# Patient Record
Sex: MALE | Race: WHITE
[De-identification: names, ages, dates, MRNs, and addresses within clinical notes are randomized per-mention and may not be internally consistent; named-entity substitution may affect disease eponyms.]

---

## 2018-04-02 ENCOUNTER — HOSPITAL ENCOUNTER (OUTPATIENT)
Dept: HOSPITAL 17 - CPRE | Age: 83
End: 2018-04-02
Attending: SURGERY
Payer: MEDICARE

## 2018-04-02 DIAGNOSIS — Z01.812: Primary | ICD-10-CM

## 2018-04-02 DIAGNOSIS — M79.609: ICD-10-CM

## 2018-04-02 LAB
ALBUMIN SERPL-MCNC: 3.7 GM/DL (ref 3.4–5)
ALP SERPL-CCNC: 113 U/L (ref 45–117)
ALT SERPL-CCNC: 21 U/L (ref 12–78)
AST SERPL-CCNC: 25 U/L (ref 15–37)
BASOPHILS # BLD AUTO: 0.1 TH/MM3 (ref 0–0.2)
BASOPHILS NFR BLD: 0.7 % (ref 0–2)
BILIRUB SERPL-MCNC: 0.9 MG/DL (ref 0.2–1)
BUN SERPL-MCNC: 21 MG/DL (ref 7–18)
CALCIUM SERPL-MCNC: 8.4 MG/DL (ref 8.5–10.1)
CHLORIDE SERPL-SCNC: 109 MEQ/L (ref 98–107)
COLOR UR: YELLOW
CREAT SERPL-MCNC: 1.45 MG/DL (ref 0.6–1.3)
EOSINOPHIL # BLD: 0.1 TH/MM3 (ref 0–0.4)
EOSINOPHIL NFR BLD: 1 % (ref 0–4)
ERYTHROCYTE [DISTWIDTH] IN BLOOD BY AUTOMATED COUNT: 14.3 % (ref 11.6–17.2)
GFR SERPLBLD BASED ON 1.73 SQ M-ARVRAT: 46 ML/MIN (ref 89–?)
GLUCOSE UR STRIP-MCNC: (no result) MG/DL
HCO3 BLD-SCNC: 24.1 MEQ/L (ref 21–32)
HCT VFR BLD CALC: 33.7 % (ref 39–51)
HGB BLD-MCNC: 12.3 GM/DL (ref 13–17)
HGB UR QL STRIP: (no result)
INR PPP: 1.1 RATIO
KETONES UR STRIP-MCNC: (no result) MG/DL
LYMPHOCYTES # BLD AUTO: 1.8 TH/MM3 (ref 1–4.8)
LYMPHOCYTES NFR BLD AUTO: 26.7 % (ref 9–44)
MCH RBC QN AUTO: 39.8 PG (ref 27–34)
MCHC RBC AUTO-ENTMCNC: 36.4 % (ref 32–36)
MCV RBC AUTO: 109.3 FL (ref 80–100)
MONOCYTE #: 0.6 TH/MM3 (ref 0–0.9)
MONOCYTES NFR BLD: 8.3 % (ref 0–8)
MUCOUS THREADS #/AREA URNS LPF: (no result) /LPF
NEUTROPHILS # BLD AUTO: 4.4 TH/MM3 (ref 1.8–7.7)
NEUTROPHILS NFR BLD AUTO: 63.3 % (ref 16–70)
NITRITE UR QL STRIP: (no result)
PLATELET # BLD: 335 TH/MM3 (ref 150–450)
PMV BLD AUTO: 9.5 FL (ref 7–11)
PROT SERPL-MCNC: 7.8 GM/DL (ref 6.4–8.2)
PROTHROMBIN TIME: 11.3 SEC (ref 9.8–11.6)
RBC # BLD AUTO: 3.08 MIL/MM3 (ref 4.5–5.9)
SODIUM SERPL-SCNC: 140 MEQ/L (ref 136–145)
SP GR UR STRIP: 1.02 (ref 1–1.03)
SPHEROCYTES BLD QL SMEAR: (no result)
URINE LEUKOCYTE ESTERASE: (no result)
WBC # BLD AUTO: 6.9 TH/MM3 (ref 4–11)

## 2018-04-02 PROCEDURE — 80053 COMPREHEN METABOLIC PANEL: CPT

## 2018-04-02 PROCEDURE — 81001 URINALYSIS AUTO W/SCOPE: CPT

## 2018-04-02 PROCEDURE — 85610 PROTHROMBIN TIME: CPT

## 2018-04-02 PROCEDURE — 36415 COLL VENOUS BLD VENIPUNCTURE: CPT

## 2018-04-02 PROCEDURE — 85025 COMPLETE CBC W/AUTO DIFF WBC: CPT

## 2018-04-02 PROCEDURE — 85730 THROMBOPLASTIN TIME PARTIAL: CPT

## 2018-04-03 NOTE — MH
cc:

TYLER Rabago MD

****

 

 

DATE OF ADMISSION:

04/09/2018

 

ADMITTING DIAGNOSIS:

Osteoarthritic degeneration of the left knee, now being admitted for 

left total knee arthroplasty.

 

HISTORY OF PRESENT ILLNESS:

This pleasant 87-year-old male is being admitted today for a left 

total knee arthroplasty due to severe painful osteoarthritic 

degeneration of the left knee.  He  has been cleared by his primary 

care physician.

 

PAST MEDICAL HISTORY:

He has a history of dizziness.  He does have a type of anemia for 

which he is on B12.

 

MEDICATIONS:

He also takes Folic acid, omeprazole and tamsulosin.

 

REVIEW OF SYSTEMS:

Noncontributory.

 

FAMILY HISTORY:

Noncontributory.

 

SOCIAL HISTORY:

The patient does not smoke cigarettes and he does not drink.

 

ALLERGIES:

NO KNOWN ALLERGIES.

 

PHYSICAL EXAMINATION:

GENERAL  We find an 87-year-old male, well-developed, well-nourished, 

alert and oriented x 3, complaining of pain in his left knee.

VITAL SIGNS:  Blood pressure 118/78, pulse 75 and regular, 

respirations 16, temperature 98.4, pulse oximetry 98% on room air.

HEENT:  Eyes:  PERRL.  EOMI.  Ears, nose, and mouth clear.

NECK:  Supple.

LUNGS:  Clear.

HEART:  Regular rate.

ABDOMEN:  Soft, positive bowel sounds, nontender.

EXTREMITIES:  Reveals the left knee to be tender with crepitance on 

range of motion.  He lacks 5 degrees short of full extension.  He is 

neurovascularly intact to his toes.

 

IMPRESSION:

Severe painful osteoarthritic degeneration of the left knee.

 

PLAN:

Admission for left total knee arthroplasty today.  The patient is 

given his current postoperative pain, anticoagulation control in the 

office, plans on going home with home health care and physical therapy

after the surgical stay in the hospital.

 

 

__________________________________

TYLER Rabago MD

 

 

JRNICK/KD

D: 04/03/2018, 04:59 PM

T: 04/03/2018, 05:22 PM

Visit #: [id number]

Job #: [id number]

## 2018-04-09 ENCOUNTER — HOSPITAL ENCOUNTER (INPATIENT)
Dept: HOSPITAL 17 - HSDI | Age: 83
LOS: 3 days | Discharge: NURSING, REHAB OR LONG-TERM CARE | DRG: 470 | End: 2018-04-12
Attending: SURGERY | Admitting: SURGERY
Payer: MEDICARE

## 2018-04-09 VITALS
TEMPERATURE: 98.5 F | RESPIRATION RATE: 18 BRPM | DIASTOLIC BLOOD PRESSURE: 75 MMHG | OXYGEN SATURATION: 97 % | HEART RATE: 73 BPM | SYSTOLIC BLOOD PRESSURE: 123 MMHG

## 2018-04-09 VITALS — WEIGHT: 176.37 LBS | HEIGHT: 71 IN | BODY MASS INDEX: 24.69 KG/M2

## 2018-04-09 VITALS — HEART RATE: 68 BPM

## 2018-04-09 VITALS
TEMPERATURE: 97.7 F | HEART RATE: 67 BPM | DIASTOLIC BLOOD PRESSURE: 73 MMHG | RESPIRATION RATE: 17 BRPM | SYSTOLIC BLOOD PRESSURE: 154 MMHG | OXYGEN SATURATION: 95 %

## 2018-04-09 DIAGNOSIS — D47.2: ICD-10-CM

## 2018-04-09 DIAGNOSIS — M17.12: Primary | ICD-10-CM

## 2018-04-09 DIAGNOSIS — K21.9: ICD-10-CM

## 2018-04-09 DIAGNOSIS — N40.0: ICD-10-CM

## 2018-04-09 PROCEDURE — 84132 ASSAY OF SERUM POTASSIUM: CPT

## 2018-04-09 PROCEDURE — 80048 BASIC METABOLIC PNL TOTAL CA: CPT

## 2018-04-09 PROCEDURE — 3E0T3BZ INTRODUCTION OF ANESTHETIC AGENT INTO PERIPHERAL NERVES AND PLEXI, PERCUTANEOUS APPROACH: ICD-10-PCS

## 2018-04-09 PROCEDURE — 85018 HEMOGLOBIN: CPT

## 2018-04-09 PROCEDURE — C1776 JOINT DEVICE (IMPLANTABLE): HCPCS

## 2018-04-09 PROCEDURE — 94150 VITAL CAPACITY TEST: CPT

## 2018-04-09 PROCEDURE — 86900 BLOOD TYPING SEROLOGIC ABO: CPT

## 2018-04-09 PROCEDURE — 86850 RBC ANTIBODY SCREEN: CPT

## 2018-04-09 PROCEDURE — 73560 X-RAY EXAM OF KNEE 1 OR 2: CPT

## 2018-04-09 PROCEDURE — 85025 COMPLETE CBC W/AUTO DIFF WBC: CPT

## 2018-04-09 PROCEDURE — C9290 INJ, BUPIVACAINE LIPOSOME: HCPCS

## 2018-04-09 PROCEDURE — 86901 BLOOD TYPING SEROLOGIC RH(D): CPT

## 2018-04-09 PROCEDURE — 83735 ASSAY OF MAGNESIUM: CPT

## 2018-04-09 PROCEDURE — 85014 HEMATOCRIT: CPT

## 2018-04-09 PROCEDURE — 86922 COMPATIBILITY TEST ANTIGLOB: CPT

## 2018-04-09 PROCEDURE — 0SRD0J9 REPLACEMENT OF LEFT KNEE JOINT WITH SYNTHETIC SUBSTITUTE, CEMENTED, OPEN APPROACH: ICD-10-PCS | Performed by: SURGERY

## 2018-04-09 PROCEDURE — 86920 COMPATIBILITY TEST SPIN: CPT

## 2018-04-09 RX ADMIN — TAMSULOSIN HYDROCHLORIDE SCH MG: 0.4 CAPSULE ORAL at 20:13

## 2018-04-09 RX ADMIN — TEMAZEPAM PRN MG: 15 CAPSULE ORAL at 21:59

## 2018-04-09 RX ADMIN — OXYTOCIN SCH MLS/HR: 10 INJECTION, SOLUTION INTRAMUSCULAR; INTRAVENOUS at 17:55

## 2018-04-09 RX ADMIN — HYDROCODONE BITARTRATE AND ACETAMINOPHEN PRN TAB: 7.5; 325 TABLET ORAL at 20:13

## 2018-04-09 NOTE — HHI.PR
__________________________________________________





Immediate Post Op Note


Procedure Date:


Apr 9, 2018


Pre Op Diagnosis:  


Osteoarthritic degeneration of the left knee


Post Op Diagnosis:  


Osteoarthritic degeneration of the left knee


Surgeon:


ALFREDO Rabago MD


Assistant(s):


Lisa NUGENT


Procedure:


Left Total Knee Arthroplasty


Complications:


none


Specimen(s) removed:


none


Estimated blood loss:


200 cc


Anesthesia:  Spinal


Drains:  None


IVF


Urinary Output (mLs):  0 (No palma)


Tourniquet time (min at mmHg)


none


Patient to:  PACU


Patient Condition:  Good


Implant/Devices:  SEE IMPLANT LOG (if applicable)


Date/Time of Procedure:  SEE SURGICAL CARE RECORD











Lisa Aldridge Apr 9, 2018 17:32

## 2018-04-09 NOTE — RADRPT
EXAM DATE/TIME:  04/09/2018 18:46 

 

[facility] COMPARISON:     

No previous studies available for comparison.

 

                     

INDICATIONS :     

Post operative left knee.

                     

 

MEDICAL HISTORY :     

None.          

 

SURGICAL HISTORY :     

None.   

 

ENCOUNTER:     

Initial                                        

 

ACUITY:     

1 day      

 

PAIN SCORE:     

0/10

 

LOCATION:     

Left  knee.

 

FINDINGS:     

Two view examination of the left knee demonstrates postoperative left total knee replacement. Air and
 fluid in the soft tissues. No complications identified.

 

CONCLUSION:     

1. Postoperative left total knee replacement as above.

 

 

 

 Wm Dobson MD on April 09, 2018 at 18:02           

Board Certified Radiologist.

 This report was verified electronically.

## 2018-04-09 NOTE — MP
cc:

TYLER Rabago MD

****

 

 

DATE OF OPERATION:

04/09/2018

 

PREOPERATIVE DIAGNOSIS:

Osteoarthritic degeneration, left knee.

 

POSTOPERATIVE DIAGNOSIS:

Osteoarthritic degeneration, left knee.

 

PROCEDURE PERFORMED:

Left total knee arthroplasty using Consensus components, size 6 femur,

4 tibia, 10 standard insert and size 2 x 10 mm patella with 3 batches 

of DePuy cement.

 

SURGEON:

TYLER Rabago MD

 

ASSISTANT:

JOVANNA Raines

 

TYPE OF ANESTHESIA:

Spinal.

 

PROCEDURE:  After successful induction of anesthesia, the patient is 

placed on the operating room table in the supine position.  The knee 

is prepped and draped in the usual manner.  No tourniquet was utilized

in this case.  A longitudinal incision is made extending from 3 inches

proximal to the superior pole of the patella, across the patella in 

longitudinal fashion, and down past the insertion of the tibial 

tubercle into the proximal tibia.  The incision is carried down 

through subcutaneous tissue along the medial aspect of the patella and

retinaculum, down through the capsule to expose the knee joint.  The 

patella and patellar tendon are freed up enough to allow the patella 

to be inverted and retracted off the lateral side of the knee joint.  

The knee joint is left exposed.  Small osteophytes are removed.  All 

soft tissue is removed to allow proper position of the femoral and 

tibial cutting jig guide.  The first femoral jig is then inserted 

along the distal end of the femur after first measuring to decide 

whether this is a small, medium, or large component.  The notch is 

then drilled and the tibial cutting guide inserted into the femoral 

cutting guide, along with the ankle brace to allow for proper 

measurement of the tibial cutting surface that needed to be resected. 

Pins are inserted into the tibial cutting jig and femoral cutting jig 

to hold them in place.  An oscillating saw is then used to resect the 

surface of the tibia.  The surface of the tibia is then completely 

removed using sharp and blunt dissection.  The anterior and posterior 

cuts of the femur are then made as well using an oscillating saw 

through the cutting guide.  All guides are then removed and the 

varus/valgus angulation cutting guide applied to the femur for proper 

measurement of the proper amount of valgus.  The anterior cutting 

guide for the femur is then inserted at the anterior femoral cuts 

made.  Next, the first block trial is inserted into the femur to allow

for proper condyle drill holes to be made which are then made followed

by removal of the bone between the condyles using an oscillating saw 

as well as the bone removed at the most posterior surface of the 

condyle.  After this, this guide is removed and the chamfer cuts made 

using the chamfer cutting guide from both anterior and posterior.  

Next, the femoral trial is then inserted, the tibial surface reflected

anterior to expose the tibial surface and a tibial stem guide is 

inserted after first measuring for a standard, standard plus, large, 

or large plus surface to be used.  After the stem is impacted the 

trial tibial surface is applied followed by the trial meniscal 

components.  After full range of motion is found with the appropriate 

length meniscal components varying the patella is prepared by 

resecting the posterior aspect of the patella using an oscillating 

saw, inserting a trial.  The trial is then removed and the cruciate 

cutting guide applied using the bur to cut the cruciate cuts.  After 

cruciate cuts are made all trials are removed.  The wound is irrigated

copiously with antibiotic solution and Water Pik and the actual 

components inserted into place using the aforementioned components.

 

The cement has hardened and the components are found to have full 

range of motion with no instability.  The wound again is irrigated 

copiously with antibiotic solution.  Meticulous hemostasis achieved 

and 120 mL mixture of Exparel, normal saline and 0.25% Marcaine used 

for extra pain control around the knee joint.  Deep fascia 

approximated with running #2 Quill.  Subcutaneous tissue approximated 

using interrupted running 2-0 and 4-0 Monocryl suture and Primapore 

dressing applied and knee immobilizer.  No drain utilized.

 

ESTIMATED BLOOD LOSS:  200 mL.

 

COUNTS:  Sponge and suture counts were correct.

 

COMPONENTS:  The components used were Consensus components, size 6 

femur, 4 tibia, 10 standard insert and size 2 x 10 mm patella with 3 

batches of DePuy cement.

 

The patient tolerated the procedure well and left the operating room 

in satisfactory condition.  Lisa Aldridge was present during the entire

procedure to include patient positioning and the procedure.  The 

medical necessity of the nurse practitioner first assistant was 

indicated in this case due to the surgical complexity of the case 

itself and during the surgical case, the surgical tech was working the

back table while my surgical first assistant ARNP was directly 

assisting me.

 

 

 

__________________________________

J. MD CHELITA Zarco/SHA

D: 04/09/2018, 05:00 PM

T: 04/09/2018, 05:29 PM

Visit #: [id number]

Job #: [id number]

## 2018-04-09 NOTE — HHI.FF
Face to Face Verification


Diagnosis:  


(1) Status post total left knee replacement


Physical Therapy


Gait training


Knee:  Total knee, Protocol: Left, Gait training, Full weight bearing


Canvas Knee Splint:  When in bed & 2 pillows btw thighs





Nursing


RN:  3 days/week x 2 weeks


Nursing:  Dressing changes


Dressing Changes:  Daily dressing change, 4x4s, Gauze, Paper tape











I have seen patient Arthur Ramos on 4/9/18. My clinical findings support 

the need for the requested home health care services because:








 Limited ability to care for self





 High risk of falls














I certify that my clinical findings support that this patient is homebound 

because:








 Unsteady gait/balance

















TYLER Rabago MD Apr 9, 2018 14:16

## 2018-04-10 VITALS
OXYGEN SATURATION: 94 % | RESPIRATION RATE: 18 BRPM | DIASTOLIC BLOOD PRESSURE: 57 MMHG | TEMPERATURE: 97.8 F | HEART RATE: 84 BPM | SYSTOLIC BLOOD PRESSURE: 122 MMHG

## 2018-04-10 VITALS
HEART RATE: 81 BPM | RESPIRATION RATE: 18 BRPM | SYSTOLIC BLOOD PRESSURE: 128 MMHG | DIASTOLIC BLOOD PRESSURE: 66 MMHG | OXYGEN SATURATION: 97 % | TEMPERATURE: 97.9 F

## 2018-04-10 VITALS
DIASTOLIC BLOOD PRESSURE: 67 MMHG | SYSTOLIC BLOOD PRESSURE: 130 MMHG | TEMPERATURE: 97.5 F | HEART RATE: 93 BPM | RESPIRATION RATE: 15 BRPM | OXYGEN SATURATION: 93 %

## 2018-04-10 VITALS
TEMPERATURE: 98.2 F | RESPIRATION RATE: 16 BRPM | DIASTOLIC BLOOD PRESSURE: 69 MMHG | OXYGEN SATURATION: 94 % | SYSTOLIC BLOOD PRESSURE: 136 MMHG | HEART RATE: 98 BPM

## 2018-04-10 VITALS
HEART RATE: 92 BPM | OXYGEN SATURATION: 94 % | SYSTOLIC BLOOD PRESSURE: 125 MMHG | TEMPERATURE: 98.5 F | DIASTOLIC BLOOD PRESSURE: 63 MMHG | RESPIRATION RATE: 17 BRPM

## 2018-04-10 VITALS
HEART RATE: 88 BPM | DIASTOLIC BLOOD PRESSURE: 62 MMHG | RESPIRATION RATE: 17 BRPM | TEMPERATURE: 98.6 F | OXYGEN SATURATION: 94 % | SYSTOLIC BLOOD PRESSURE: 128 MMHG

## 2018-04-10 LAB
HCT VFR BLD CALC: 26.6 % (ref 39–51)
HGB BLD-MCNC: 9.4 GM/DL (ref 13–17)

## 2018-04-10 RX ADMIN — HYDROCODONE BITARTRATE AND ACETAMINOPHEN PRN TAB: 7.5; 325 TABLET ORAL at 22:52

## 2018-04-10 RX ADMIN — HYDROCODONE BITARTRATE AND ACETAMINOPHEN PRN TAB: 7.5; 325 TABLET ORAL at 14:30

## 2018-04-10 RX ADMIN — OXYTOCIN SCH MLS/HR: 10 INJECTION, SOLUTION INTRAMUSCULAR; INTRAVENOUS at 14:04

## 2018-04-10 RX ADMIN — DOCUSATE SODIUM SCH MG: 100 CAPSULE, LIQUID FILLED ORAL at 20:15

## 2018-04-10 RX ADMIN — OXYTOCIN SCH MLS/HR: 10 INJECTION, SOLUTION INTRAMUSCULAR; INTRAVENOUS at 02:39

## 2018-04-10 RX ADMIN — MULTIPLE VITAMINS W/ MINERALS TAB SCH TAB: TAB at 20:15

## 2018-04-10 RX ADMIN — TEMAZEPAM PRN MG: 15 CAPSULE ORAL at 22:51

## 2018-04-10 RX ADMIN — HYDROCODONE BITARTRATE AND ACETAMINOPHEN PRN TAB: 7.5; 325 TABLET ORAL at 10:12

## 2018-04-10 RX ADMIN — TAMSULOSIN HYDROCHLORIDE SCH MG: 0.4 CAPSULE ORAL at 20:15

## 2018-04-10 RX ADMIN — FOLIC ACID SCH MG: 1 TABLET ORAL at 08:29

## 2018-04-10 RX ADMIN — HYDROCODONE BITARTRATE AND ACETAMINOPHEN PRN TAB: 7.5; 325 TABLET ORAL at 00:35

## 2018-04-10 RX ADMIN — CYANOCOBALAMIN TAB 1000 MCG SCH MCG: 1000 TAB at 08:29

## 2018-04-10 RX ADMIN — HYDROCODONE BITARTRATE AND ACETAMINOPHEN PRN TAB: 7.5; 325 TABLET ORAL at 18:41

## 2018-04-10 RX ADMIN — HYDROCODONE BITARTRATE AND ACETAMINOPHEN PRN TAB: 7.5; 325 TABLET ORAL at 05:31

## 2018-04-10 RX ADMIN — PANTOPRAZOLE SODIUM SCH MG: 20 TABLET, DELAYED RELEASE ORAL at 08:29

## 2018-04-10 RX ADMIN — APIXABAN SCH MG: 2.5 TABLET, FILM COATED ORAL at 16:48

## 2018-04-10 NOTE — PD.ORT.PN
Subjective


Subjective Remarks


Pt comfortable at present time.





Objective


Vitals





Vital Signs








  Date Time  Temp Pulse Resp B/P (MAP) Pulse Ox O2 Delivery O2 Flow Rate FiO2


 


4/10/18 08:00     94 Room Air  


 


4/10/18 08:00 98.2 98 16 136/69 (91) 94   


 


4/10/18 06:17   18     


 


4/10/18 04:45 98.5 92 17 125/63 (83) 94   


 


4/10/18 00:05 98.6 88 17 128/62 (84) 94   


 


4/9/18 22:52      Room Air  


 


4/9/18 20:57   18     


 


4/9/18 19:40 97.7 67 17 154/73 (100) 95   


 


4/9/18 18:26 97.9 62 18 136/68 (90) 97 Room Air  


 


4/9/18 18:15  59 15 143/71 (95) 97 Room Air  


 


4/9/18 18:00  60 18 141/66 (91) 95 Room Air  


 


4/9/18 17:45  60 17 163/70 (101) 95 Room Air  


 


4/9/18 17:30  61 20 144/72 (96) 97 Room Air  


 


4/9/18 17:26 98.0 69 19 179/75 (109) 98 Room Air  


 


4/9/18 12:23     99 Nasal Cannula 2 


 


4/9/18 12:23  68      


 


4/9/18 12:17 97.8 71 18 161/78 (105) 100   














I/O      


 


 4/9/18 4/9/18 4/9/18 4/10/18 4/10/18 4/10/18





 07:00 15:00 23:00 07:00 15:00 23:00


 


Intake Total   1620 ml 580 ml  


 


Output Total   200 ml 475 ml  


 


Balance   1420 ml 105 ml  


 


      


 


Intake Oral   20 ml 480 ml  


 


IV Total   100 ml 100 ml  


 


Other   1500 ml   


 


Output Urine Total    475 ml  


 


Estimated Blood Loss   200 ml   


 


# Bowel Movements    0  








Result Diagram:  


4/10/18 0501                                                                   

             4/9/18 1202





Imaging





Last 24 hours Impressions








Knee X-Ray 4/9/18 1409 Signed





Impressions: 





 Service Date/Time:  Monday, April 9, 2018 18:46 - CONCLUSION:  1. 

Postoperative 





 left total knee replacement as above.     Wm Dobson MD 








Objective Remarks


dressing dry and intact. In bed at present. No calf tenderness.





Assessment & Plan


Ortho Post Op Day #:  1


Problem List:  


Assessment and Plan


PT, OOB, home possibly tomorrow.











TYLER Rabago MD Apr 10, 2018 09:55

## 2018-04-10 NOTE — PD.CONS
HPI


Service


[facility] Hospitalists


Consult Requested By


Dr. Isai Rabago


Reason for Consult


Medical Management


Primary Care Physician


Colton Hartman MD


Diagnoses:  


History of Present Illness


Mr. Ramos is a pleasant 86 y/o male with osteoarthritis, BPH, GERD and MGUS (

follows with Dr. Montoya). [place] [facility] [place] for left total 

knee arthroplasty with Dr. Rabago. [facility] Hospitalist team was consulted to help 

with managing his chronic medical issues. Pt is seen post-operatively and is 

doing well. His vital signs are stable. Pt is without any specific complaints 

at the time of examination today. Denies any abd pain, nausea/vomiting, chest 

pain, SOB, palpitations, dizziness or weakness. His pain is fairly well 

controlled at this time.





Review of Systems


Constitutional:  DENIES: Fever, Chills


Eyes:  DENIES: Vision loss


Ears, nose, mouth, throat:  DENIES: Hearing loss


Respiratory:  DENIES: Shortness of breath


Cardiovascular:  DENIES: Chest pain, Palpitations


Gastrointestinal:  DENIES: Abdominal pain, Nausea, Vomiting


Genitourinary:  DENIES: Hematuria, Dysuria


Musculoskeletal:  COMPLAINS OF: Joint pain, DENIES: Back pain


Integumentary:  DENIES: Rash


Neurologic:  DENIES: Headache


Psychiatric:  DENIES: Confusion





Past Family Social History


Past Medical History


Osteoarthritis


BPH


GERD


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


MGUS


Thrombocytosis


Memory loss


Nonthrombocytopenic purpura


Past Surgical History


Appendectomy


Partial colectomy


Vasectomy


Tonsillectomy


Reported Medications


Diazepam 5 Mg PO TID PRN


Tamsulosin 0.4 Mg  HS


Omeprazole 20 Mg PO DAILY


Vitamin B12 1,000 Mcg PO DAILY


Folic Acid 2,000 Mcg PO DAILY


Allergies:  


Coded Allergies:  


     No Known Allergies (Unverified , 4/2/18)


Family History


Noncontributory


Social History


No reported alcohol, tobacco or illicit drug use





Physical Exam


Vital Signs





Vital Signs








  Date Time  Temp Pulse Resp B/P (MAP) Pulse Ox O2 Delivery O2 Flow Rate FiO2


 


4/10/18 08:00     94 Room Air  


 


4/10/18 08:00 98.2 98 16 136/69 (91) 94   


 


4/10/18 06:17   18     


 


4/10/18 04:45 98.5 92 17 125/63 (83) 94   


 


4/10/18 00:05 98.6 88 17 128/62 (84) 94   


 


4/9/18 22:52      Room Air  


 


4/9/18 20:57   18     


 


4/9/18 19:40 97.7 67 17 154/73 (100) 95   


 


4/9/18 18:26 97.9 62 18 136/68 (90) 97 Room Air  


 


4/9/18 18:15  59 15 143/71 (95) 97 Room Air  


 


4/9/18 18:00  60 18 141/66 (91) 95 Room Air  


 


4/9/18 17:45  60 17 163/70 (101) 95 Room Air  


 


4/9/18 17:30  61 20 144/72 (96) 97 Room Air  


 


4/9/18 17:26 98.0 69 19 179/75 (109) 98 Room Air  


 


4/9/18 12:23     99 Nasal Cannula 2 


 


4/9/18 12:23  68      


 


4/9/18 12:17 97.8 71 18 161/78 (105) 100   








Physical Exam


GENERAL: This is a well-nourished, well-developed patient, in no apparent 

distress.


SKIN: No rashes, ecchymoses or lesions. Cool and dry.


HEENT: Atraumatic. Normocephalic. No temporal or scalp tenderness. No scleral 

icterus. Airway patent.


NECK: Trachea midline, supple, nontender.


CARDIO: Regular. 


RESP: CTA bilaterally. No wheezes, rales, or rhonchi.  


ABD: +BS, soft, non-tender, nondistended. 


EXT: Extremities without clubbing, cyanosis, or edema. 


NEURO: Awake and alert.  Motor and sensory grossly within normal limits. Normal 

speech.


Laboratory





Laboratory Tests








Test


  4/9/18


12:02 4/10/18


07:45


 


Potassium Level 4.5  


 


Hemoglobin  9.4 


 


Hematocrit  26.6 








Result Diagram:  


4/10/18 0745                                                                   

             4/9/18 1202





Imaging





Last Impressions








Knee X-Ray 4/9/18 1409 Signed





Impressions: 





 Service Date/Time:  Monday, April 9, 2018 18:46 - CONCLUSION:  1. 

Postoperative 





 left total knee replacement as above.     Wm Dobson MD 











Assessment and Plan


Problem List:  


(1) Osteoarthritis of left knee


ICD Codes:  M17.12 - Unilateral primary osteoarthritis, left knee


Status:  Chronic


Plan:  


s/p Left total knee arthroplasty


Osteoarthritis


- Pt is an 86 y/o male with osteoarthritis, BPH, GERD and MGUS


- He was admitted on 4/9/18 for left total knee arthroplasty


- Post-op pain control per Ortho


- PT daily


- IS


- Constipation precautions


- DVT prophylaxis with Eliquis 2.5mg po BID





BPH


- Home meds continued





GERD


- PPI





MGUS


- Pt follows outpt with Dr. Montoya


- Monitor labs





(2) BPH (benign prostatic hyperplasia)


ICD Codes:  N40.0 - Benign prostatic hyperplasia without lower urinary tract 

symptoms


Status:  Chronic


(3) GERD (gastroesophageal reflux disease)


ICD Codes:  K21.9 - Gastro-esophageal reflux disease without esophagitis


Status:  Chronic


(4) MGUS (monoclonal gammopathy of unknown significance)


ICD Codes:  D47.2 - Monoclonal gammopathy


Status:  Chronic


Assessment and Plan


Patient examined.


Assessment and plan formulated with Mckenna Villarreal PA-C.


I agree with the above.





Problem Qualifiers





(1) Osteoarthritis of left knee:  


Qualified Codes:  M17.12 - Unilateral primary osteoarthritis, left knee








Mckenna Villarreal Apr 10, 2018 10:41


Joe Blanco DO Apr 13, 2018 23:20

## 2018-04-11 VITALS
DIASTOLIC BLOOD PRESSURE: 62 MMHG | RESPIRATION RATE: 16 BRPM | OXYGEN SATURATION: 96 % | HEART RATE: 102 BPM | SYSTOLIC BLOOD PRESSURE: 119 MMHG | TEMPERATURE: 98.5 F

## 2018-04-11 VITALS
OXYGEN SATURATION: 93 % | TEMPERATURE: 98.5 F | DIASTOLIC BLOOD PRESSURE: 59 MMHG | HEART RATE: 95 BPM | RESPIRATION RATE: 18 BRPM | SYSTOLIC BLOOD PRESSURE: 111 MMHG

## 2018-04-11 VITALS
TEMPERATURE: 98.9 F | SYSTOLIC BLOOD PRESSURE: 124 MMHG | HEART RATE: 86 BPM | OXYGEN SATURATION: 94 % | DIASTOLIC BLOOD PRESSURE: 59 MMHG | RESPIRATION RATE: 18 BRPM

## 2018-04-11 VITALS
OXYGEN SATURATION: 96 % | HEART RATE: 91 BPM | RESPIRATION RATE: 18 BRPM | SYSTOLIC BLOOD PRESSURE: 127 MMHG | TEMPERATURE: 99 F | DIASTOLIC BLOOD PRESSURE: 66 MMHG

## 2018-04-11 VITALS
RESPIRATION RATE: 18 BRPM | HEART RATE: 100 BPM | SYSTOLIC BLOOD PRESSURE: 115 MMHG | TEMPERATURE: 98.5 F | DIASTOLIC BLOOD PRESSURE: 58 MMHG | OXYGEN SATURATION: 95 %

## 2018-04-11 LAB
BASOPHILS # BLD AUTO: 0.1 TH/MM3 (ref 0–0.2)
BASOPHILS NFR BLD: 0.4 % (ref 0–2)
BUN SERPL-MCNC: 21 MG/DL (ref 7–18)
CALCIUM SERPL-MCNC: 8.3 MG/DL (ref 8.5–10.1)
CHLORIDE SERPL-SCNC: 105 MEQ/L (ref 98–107)
CREAT SERPL-MCNC: 1.56 MG/DL (ref 0.6–1.3)
EOSINOPHIL # BLD: 0 TH/MM3 (ref 0–0.4)
EOSINOPHIL NFR BLD: 0.1 % (ref 0–4)
ERYTHROCYTE [DISTWIDTH] IN BLOOD BY AUTOMATED COUNT: 13.8 % (ref 11.6–17.2)
GFR SERPLBLD BASED ON 1.73 SQ M-ARVRAT: 42 ML/MIN (ref 89–?)
GLUCOSE SERPL-MCNC: 119 MG/DL (ref 74–106)
HCO3 BLD-SCNC: 25.5 MEQ/L (ref 21–32)
HCT VFR BLD CALC: 25.7 % (ref 39–51)
HGB BLD-MCNC: 9.2 GM/DL (ref 13–17)
LYMPHOCYTES # BLD AUTO: 1.4 TH/MM3 (ref 1–4.8)
LYMPHOCYTES NFR BLD AUTO: 9.9 % (ref 9–44)
MCH RBC QN AUTO: 38.6 PG (ref 27–34)
MCHC RBC AUTO-ENTMCNC: 35.7 % (ref 32–36)
MCV RBC AUTO: 108.1 FL (ref 80–100)
MONOCYTE #: 1 TH/MM3 (ref 0–0.9)
MONOCYTES NFR BLD: 6.8 % (ref 0–8)
NEUTROPHILS # BLD AUTO: 11.9 TH/MM3 (ref 1.8–7.7)
NEUTROPHILS NFR BLD AUTO: 82.8 % (ref 16–70)
PLATELET # BLD: 317 TH/MM3 (ref 150–450)
PMV BLD AUTO: 8.9 FL (ref 7–11)
RBC # BLD AUTO: 2.38 MIL/MM3 (ref 4.5–5.9)
SODIUM SERPL-SCNC: 136 MEQ/L (ref 136–145)
WBC # BLD AUTO: 14.4 TH/MM3 (ref 4–11)

## 2018-04-11 RX ADMIN — PANTOPRAZOLE SODIUM SCH MG: 20 TABLET, DELAYED RELEASE ORAL at 08:07

## 2018-04-11 RX ADMIN — CYANOCOBALAMIN TAB 1000 MCG SCH MCG: 1000 TAB at 08:06

## 2018-04-11 RX ADMIN — HYDROCODONE BITARTRATE AND ACETAMINOPHEN PRN TAB: 7.5; 325 TABLET ORAL at 17:44

## 2018-04-11 RX ADMIN — APIXABAN SCH MG: 2.5 TABLET, FILM COATED ORAL at 06:17

## 2018-04-11 RX ADMIN — APIXABAN SCH MG: 2.5 TABLET, FILM COATED ORAL at 17:44

## 2018-04-11 RX ADMIN — DOCUSATE SODIUM SCH MG: 100 CAPSULE, LIQUID FILLED ORAL at 08:07

## 2018-04-11 RX ADMIN — TEMAZEPAM PRN MG: 15 CAPSULE ORAL at 21:27

## 2018-04-11 RX ADMIN — HYDROCODONE BITARTRATE AND ACETAMINOPHEN PRN TAB: 7.5; 325 TABLET ORAL at 06:18

## 2018-04-11 RX ADMIN — TAMSULOSIN HYDROCHLORIDE SCH MG: 0.4 CAPSULE ORAL at 21:27

## 2018-04-11 RX ADMIN — MULTIPLE VITAMINS W/ MINERALS TAB SCH TAB: TAB at 21:27

## 2018-04-11 RX ADMIN — HYDROCODONE BITARTRATE AND ACETAMINOPHEN PRN TAB: 7.5; 325 TABLET ORAL at 21:27

## 2018-04-11 RX ADMIN — OXYTOCIN SCH MLS/HR: 10 INJECTION, SOLUTION INTRAMUSCULAR; INTRAVENOUS at 16:09

## 2018-04-11 RX ADMIN — MULTIPLE VITAMINS W/ MINERALS TAB SCH TAB: TAB at 08:07

## 2018-04-11 RX ADMIN — FOLIC ACID SCH MG: 1 TABLET ORAL at 08:06

## 2018-04-11 RX ADMIN — HYDROCODONE BITARTRATE AND ACETAMINOPHEN PRN TAB: 7.5; 325 TABLET ORAL at 10:03

## 2018-04-11 RX ADMIN — OXYTOCIN SCH MLS/HR: 10 INJECTION, SOLUTION INTRAMUSCULAR; INTRAVENOUS at 03:39

## 2018-04-11 RX ADMIN — DOCUSATE SODIUM SCH MG: 100 CAPSULE, LIQUID FILLED ORAL at 21:27

## 2018-04-11 NOTE — HHI.DS
Discharge Summary


Admission Date


Apr 9, 2018 at 11:19


Discharge Date:  Apr 11, 2018


Admitting Diagnosis


Osteoarthritic degeneration left knee


Diagnosis:  


(1) Status post total left knee replacement


ICD Codes:  Z96.652 - Presence of left artificial knee joint


Brief History


This is a 87 year old male patient


CBC/BMP:  


4/10/18 0745                                                                   

             4/9/18 1202





Significant Findings





Laboratory Tests








Test


  4/9/18


12:02 4/10/18


07:45 4/11/18


06:50


 


Hemoglobin


  


  9.4 GM/DL


(13.0-17.0) 


 


 


Hematocrit


  


  26.6 %


(39.0-51.0) 


 








PE at Discharge


dressing dry and intact. In bed at present. No calf tenderness.


Hospital Course


Patient underwent a left total knee arthroplasty on day of admission.  He 

received a course of prophylactic IV antibiotics and within 23 hours started 

anticoagulation therapy.  He continued to improve remaining afebrile with 

stable vital signs.  He began out of bed tolerating food and fluid well.  He 

was discharged on postoperative day 2 inpatient rehabilitation in good 

condition for continuation of care.


Pt Condition on Discharge:  Good


Discharge Disposition:  Rehab Inpatient


Discharge Instructions


Diet Instructions:  As Tolerated, No Restrictions


Activities You Can Perform:  Full Weight Bearing, Shower Only-No Bath


Activities to Avoid:  Bathing, Driving











TYLER Rabago MD Apr 11, 2018 08:26

## 2018-04-11 NOTE — PD.ORT.PN
Subjective


Subjective Remarks


Pt having some pain in knee at present time.





Objective


Vitals





Vital Signs








  Date Time  Temp Pulse Resp B/P (MAP) Pulse Ox O2 Delivery O2 Flow Rate FiO2


 


4/11/18 07:57 99.0 91 18 127/66 (86) 96   


 


4/11/18 07:18   16     


 


4/11/18 00:40 98.9 86 18 124/59 (80) 94   


 


4/10/18 18:27 97.5 93 15 130/67 (88) 93   


 


4/10/18 16:00     94 Room Air  


 


4/10/18 15:45 97.8 84 18 122/57 (78) 94   


 


4/10/18 12:00     97 Room Air  


 


4/10/18 11:59 97.9 81 18 128/66 (86) 97   














I/O      


 


 4/10/18 4/10/18 4/10/18 4/11/18 4/11/18 4/11/18





 07:00 15:00 23:00 07:00 15:00 23:00


 


Intake Total 580 ml   200 ml  


 


Output Total 475 ml  50 ml 700 ml  


 


Balance 105 ml  -50 ml -500 ml  


 


      


 


Intake Oral 480 ml   200 ml  


 


IV Total 100 ml     


 


Output Urine Total 475 ml  50 ml 700 ml  


 


# Bowel Movements 0   0  








Result Diagram:  


4/10/18 0745                                                                   

             4/9/18 1202





Imaging





Last 24 hours Impressions








Knee X-Ray 4/9/18 1409 Signed





Impressions: 





 Service Date/Time:  Monday, April 9, 2018 18:46 - CONCLUSION:  1. 

Postoperative 





 left total knee replacement as above.     Wm Dobson MD 








Objective Remarks


dressing dry and intact. In bed at present. No calf tenderness.





Assessment & Plan


Ortho Post Op Day #:  2


Problem List:  


Assessment and Plan


PT, OOB, rehab vs [facility]..











TYLER Rabago MD Apr 11, 2018 08:22

## 2018-04-12 VITALS
SYSTOLIC BLOOD PRESSURE: 135 MMHG | RESPIRATION RATE: 18 BRPM | DIASTOLIC BLOOD PRESSURE: 63 MMHG | OXYGEN SATURATION: 94 % | HEART RATE: 99 BPM | TEMPERATURE: 98.9 F

## 2018-04-12 VITALS
DIASTOLIC BLOOD PRESSURE: 57 MMHG | TEMPERATURE: 98.2 F | OXYGEN SATURATION: 95 % | HEART RATE: 106 BPM | SYSTOLIC BLOOD PRESSURE: 115 MMHG | RESPIRATION RATE: 18 BRPM

## 2018-04-12 VITALS — RESPIRATION RATE: 16 BRPM

## 2018-04-12 VITALS
SYSTOLIC BLOOD PRESSURE: 104 MMHG | RESPIRATION RATE: 18 BRPM | TEMPERATURE: 99.7 F | OXYGEN SATURATION: 93 % | HEART RATE: 104 BPM | DIASTOLIC BLOOD PRESSURE: 58 MMHG

## 2018-04-12 VITALS
TEMPERATURE: 99.1 F | SYSTOLIC BLOOD PRESSURE: 105 MMHG | DIASTOLIC BLOOD PRESSURE: 56 MMHG | HEART RATE: 100 BPM | RESPIRATION RATE: 18 BRPM | OXYGEN SATURATION: 94 %

## 2018-04-12 VITALS
OXYGEN SATURATION: 95 % | DIASTOLIC BLOOD PRESSURE: 58 MMHG | SYSTOLIC BLOOD PRESSURE: 122 MMHG | TEMPERATURE: 99.5 F | HEART RATE: 104 BPM | RESPIRATION RATE: 18 BRPM

## 2018-04-12 LAB
BASOPHILS # BLD AUTO: 0 TH/MM3 (ref 0–0.2)
BASOPHILS NFR BLD: 0.3 % (ref 0–2)
BUN SERPL-MCNC: 27 MG/DL (ref 7–18)
CALCIUM SERPL-MCNC: 8.3 MG/DL (ref 8.5–10.1)
CHLORIDE SERPL-SCNC: 105 MEQ/L (ref 98–107)
CREAT SERPL-MCNC: 1.53 MG/DL (ref 0.6–1.3)
EOSINOPHIL # BLD: 0 TH/MM3 (ref 0–0.4)
EOSINOPHIL NFR BLD: 0.4 % (ref 0–4)
ERYTHROCYTE [DISTWIDTH] IN BLOOD BY AUTOMATED COUNT: 14.6 % (ref 11.6–17.2)
GFR SERPLBLD BASED ON 1.73 SQ M-ARVRAT: 43 ML/MIN (ref 89–?)
GLUCOSE SERPL-MCNC: 139 MG/DL (ref 74–106)
HCO3 BLD-SCNC: 26.9 MEQ/L (ref 21–32)
HCT VFR BLD CALC: 22.6 % (ref 39–51)
HGB BLD-MCNC: 8 GM/DL (ref 13–17)
LYMPHOCYTES # BLD AUTO: 1.4 TH/MM3 (ref 1–4.8)
LYMPHOCYTES NFR BLD AUTO: 13.3 % (ref 9–44)
MAGNESIUM SERPL-MCNC: 1.8 MG/DL (ref 1.5–2.5)
MCH RBC QN AUTO: 38.2 PG (ref 27–34)
MCHC RBC AUTO-ENTMCNC: 35.3 % (ref 32–36)
MCV RBC AUTO: 108.2 FL (ref 80–100)
MONOCYTE #: 0.8 TH/MM3 (ref 0–0.9)
MONOCYTES NFR BLD: 7.8 % (ref 0–8)
NEUTROPHILS # BLD AUTO: 8.2 TH/MM3 (ref 1.8–7.7)
NEUTROPHILS NFR BLD AUTO: 78.2 % (ref 16–70)
PLATELET # BLD: 293 TH/MM3 (ref 150–450)
PMV BLD AUTO: 9.9 FL (ref 7–11)
RBC # BLD AUTO: 2.08 MIL/MM3 (ref 4.5–5.9)
SODIUM SERPL-SCNC: 136 MEQ/L (ref 136–145)
WBC # BLD AUTO: 10.5 TH/MM3 (ref 4–11)

## 2018-04-12 RX ADMIN — HYDROCODONE BITARTRATE AND ACETAMINOPHEN PRN TAB: 7.5; 325 TABLET ORAL at 16:23

## 2018-04-12 RX ADMIN — DOCUSATE SODIUM SCH MG: 100 CAPSULE, LIQUID FILLED ORAL at 09:19

## 2018-04-12 RX ADMIN — PANTOPRAZOLE SODIUM SCH MG: 20 TABLET, DELAYED RELEASE ORAL at 09:19

## 2018-04-12 RX ADMIN — CYANOCOBALAMIN TAB 1000 MCG SCH MCG: 1000 TAB at 09:20

## 2018-04-12 RX ADMIN — HYDROCODONE BITARTRATE AND ACETAMINOPHEN PRN TAB: 7.5; 325 TABLET ORAL at 05:42

## 2018-04-12 RX ADMIN — MULTIPLE VITAMINS W/ MINERALS TAB SCH TAB: TAB at 09:19

## 2018-04-12 RX ADMIN — FOLIC ACID SCH MG: 1 TABLET ORAL at 09:20

## 2018-04-12 RX ADMIN — OXYTOCIN SCH MLS/HR: 10 INJECTION, SOLUTION INTRAMUSCULAR; INTRAVENOUS at 04:39

## 2018-04-12 RX ADMIN — OXYTOCIN SCH MLS/HR: 10 INJECTION, SOLUTION INTRAMUSCULAR; INTRAVENOUS at 17:09

## 2018-04-12 RX ADMIN — APIXABAN SCH MG: 2.5 TABLET, FILM COATED ORAL at 05:42

## 2018-04-12 RX ADMIN — APIXABAN SCH MG: 2.5 TABLET, FILM COATED ORAL at 16:23

## 2018-04-16 ENCOUNTER — HOSPITAL ENCOUNTER (INPATIENT)
Dept: HOSPITAL 17 - NEPC | Age: 83
LOS: 3 days | Discharge: NURSING, REHAB OR LONG-TERM CARE | DRG: 379 | End: 2018-04-19
Attending: HOSPITALIST | Admitting: HOSPITALIST
Payer: MEDICARE

## 2018-04-16 VITALS
TEMPERATURE: 98.3 F | SYSTOLIC BLOOD PRESSURE: 119 MMHG | HEART RATE: 88 BPM | DIASTOLIC BLOOD PRESSURE: 57 MMHG | OXYGEN SATURATION: 97 % | RESPIRATION RATE: 14 BRPM

## 2018-04-16 VITALS
OXYGEN SATURATION: 97 % | SYSTOLIC BLOOD PRESSURE: 123 MMHG | RESPIRATION RATE: 16 BRPM | TEMPERATURE: 98.1 F | HEART RATE: 95 BPM | DIASTOLIC BLOOD PRESSURE: 60 MMHG

## 2018-04-16 VITALS — OXYGEN SATURATION: 97 % | RESPIRATION RATE: 18 BRPM

## 2018-04-16 VITALS
DIASTOLIC BLOOD PRESSURE: 56 MMHG | TEMPERATURE: 98.4 F | SYSTOLIC BLOOD PRESSURE: 109 MMHG | OXYGEN SATURATION: 98 % | HEART RATE: 85 BPM | RESPIRATION RATE: 16 BRPM

## 2018-04-16 VITALS — HEIGHT: 66 IN | WEIGHT: 176.37 LBS | BODY MASS INDEX: 28.34 KG/M2

## 2018-04-16 DIAGNOSIS — K92.2: Primary | ICD-10-CM

## 2018-04-16 DIAGNOSIS — Z96.652: ICD-10-CM

## 2018-04-16 DIAGNOSIS — K21.9: ICD-10-CM

## 2018-04-16 DIAGNOSIS — N18.9: ICD-10-CM

## 2018-04-16 DIAGNOSIS — Z79.899: ICD-10-CM

## 2018-04-16 DIAGNOSIS — Z79.01: ICD-10-CM

## 2018-04-16 DIAGNOSIS — R23.8: ICD-10-CM

## 2018-04-16 DIAGNOSIS — Z90.49: ICD-10-CM

## 2018-04-16 DIAGNOSIS — K44.9: ICD-10-CM

## 2018-04-16 DIAGNOSIS — D64.9: ICD-10-CM

## 2018-04-16 DIAGNOSIS — K22.70: ICD-10-CM

## 2018-04-16 DIAGNOSIS — N40.0: ICD-10-CM

## 2018-04-16 DIAGNOSIS — Z85.828: ICD-10-CM

## 2018-04-16 DIAGNOSIS — K62.1: ICD-10-CM

## 2018-04-16 DIAGNOSIS — Z79.891: ICD-10-CM

## 2018-04-16 DIAGNOSIS — K29.70: ICD-10-CM

## 2018-04-16 DIAGNOSIS — K59.00: ICD-10-CM

## 2018-04-16 DIAGNOSIS — K64.8: ICD-10-CM

## 2018-04-16 DIAGNOSIS — M19.90: ICD-10-CM

## 2018-04-16 DIAGNOSIS — F10.20: ICD-10-CM

## 2018-04-16 DIAGNOSIS — K64.4: ICD-10-CM

## 2018-04-16 DIAGNOSIS — D47.2: ICD-10-CM

## 2018-04-16 LAB
ALBUMIN SERPL-MCNC: 2.4 GM/DL (ref 3.4–5)
ALP SERPL-CCNC: 214 U/L (ref 45–117)
ALT SERPL-CCNC: 88 U/L (ref 12–78)
AST SERPL-CCNC: 86 U/L (ref 15–37)
BASOPHILS # BLD AUTO: 0.1 TH/MM3 (ref 0–0.2)
BASOPHILS NFR BLD AUTO: 1 % (ref 0–2)
BASOPHILS NFR BLD: 0.6 % (ref 0–2)
BILIRUB SERPL-MCNC: 1.1 MG/DL (ref 0.2–1)
BUN SERPL-MCNC: 27 MG/DL (ref 7–18)
CALCIUM SERPL-MCNC: 7.9 MG/DL (ref 8.5–10.1)
CHLORIDE SERPL-SCNC: 100 MEQ/L (ref 98–107)
CREAT SERPL-MCNC: 1.57 MG/DL (ref 0.6–1.3)
DOHLE BOD BLD QL SMEAR: PRESENT
EOSINOPHIL # BLD: 0.2 TH/MM3 (ref 0–0.4)
EOSINOPHIL NFR BLD: 1.5 % (ref 0–4)
ERYTHROCYTE [DISTWIDTH] IN BLOOD BY AUTOMATED COUNT: 14.4 % (ref 11.6–17.2)
GFR SERPLBLD BASED ON 1.73 SQ M-ARVRAT: 42 ML/MIN (ref 89–?)
GLUCOSE SERPL-MCNC: 114 MG/DL (ref 74–106)
HCO3 BLD-SCNC: 26.1 MEQ/L (ref 21–32)
HCT VFR BLD CALC: 14.8 % (ref 39–51)
HGB BLD-MCNC: 5.3 GM/DL (ref 13–17)
INR PPP: 1.1 RATIO
LYMPHOCYTES # BLD AUTO: 2.1 TH/MM3 (ref 1–4.8)
LYMPHOCYTES NFR BLD AUTO: 15.4 % (ref 9–44)
LYMPHOCYTES: 20 % (ref 9–44)
MAGNESIUM SERPL-MCNC: 2.4 MG/DL (ref 1.5–2.5)
MCH RBC QN AUTO: 40.1 PG (ref 27–34)
MCHC RBC AUTO-ENTMCNC: 35.7 % (ref 32–36)
MCV RBC AUTO: 112.3 FL (ref 80–100)
METAMYELOCYTES NFR BLD: 5 % (ref 0–1)
MONOCYTE #: 1.3 TH/MM3 (ref 0–0.9)
MONOCYTES NFR BLD: 9.6 % (ref 0–8)
MONOCYTES: 11 % (ref 0–8)
MYELOCYTES NFR BLD: 3 % (ref 0–0)
NEUTROPHILS # BLD AUTO: 9.7 TH/MM3 (ref 1.8–7.7)
NEUTROPHILS NFR BLD AUTO: 72.9 % (ref 16–70)
NEUTS BAND # BLD MANUAL: 8.8 TH/MM3 (ref 1.8–7.7)
NEUTS BAND NFR BLD: 1 % (ref 0–6)
NEUTS SEG NFR BLD MANUAL: 55 % (ref 16–70)
OVALOCYTES BLD QL SMEAR: (no result)
PLATELET # BLD: 499 TH/MM3 (ref 150–450)
PMV BLD AUTO: 8.1 FL (ref 7–11)
POLYCHROMASIA BLD QL SMEAR: 3.2 % (ref 0–1.9)
PROMYELOCYTES NFR BLD: 2 % (ref 0–0)
PROT SERPL-MCNC: 6.5 GM/DL (ref 6.4–8.2)
PROTHROMBIN TIME: 11.1 SEC (ref 9.8–11.6)
RBC # BLD AUTO: 1.32 MIL/MM3 (ref 4.5–5.9)
ROULEAUX BLD QL SMEAR: PRESENT
SODIUM SERPL-SCNC: 133 MEQ/L (ref 136–145)
SPHEROCYTES BLD QL SMEAR: (no result)
TOXIC GRANULES BLD QL SMEAR: (no result)
TROPONIN I SERPL-MCNC: (no result) NG/ML (ref 0.02–0.05)
WBC # BLD AUTO: 13.4 TH/MM3 (ref 4–11)

## 2018-04-16 PROCEDURE — 74177 CT ABD & PELVIS W/CONTRAST: CPT

## 2018-04-16 PROCEDURE — 88312 SPECIAL STAINS GROUP 1: CPT

## 2018-04-16 PROCEDURE — 84155 ASSAY OF PROTEIN SERUM: CPT

## 2018-04-16 PROCEDURE — 86920 COMPATIBILITY TEST SPIN: CPT

## 2018-04-16 PROCEDURE — 85730 THROMBOPLASTIN TIME PARTIAL: CPT

## 2018-04-16 PROCEDURE — 84484 ASSAY OF TROPONIN QUANT: CPT

## 2018-04-16 PROCEDURE — 86922 COMPATIBILITY TEST ANTIGLOB: CPT

## 2018-04-16 PROCEDURE — 82550 ASSAY OF CK (CPK): CPT

## 2018-04-16 PROCEDURE — 85610 PROTHROMBIN TIME: CPT

## 2018-04-16 PROCEDURE — 83550 IRON BINDING TEST: CPT

## 2018-04-16 PROCEDURE — 99285 EMERGENCY DEPT VISIT HI MDM: CPT

## 2018-04-16 PROCEDURE — 93005 ELECTROCARDIOGRAM TRACING: CPT

## 2018-04-16 PROCEDURE — 36430 TRANSFUSION BLD/BLD COMPNT: CPT

## 2018-04-16 PROCEDURE — 83540 ASSAY OF IRON: CPT

## 2018-04-16 PROCEDURE — 82552 ASSAY OF CPK IN BLOOD: CPT

## 2018-04-16 PROCEDURE — 30233N1 TRANSFUSION OF NONAUTOLOGOUS RED BLOOD CELLS INTO PERIPHERAL VEIN, PERCUTANEOUS APPROACH: ICD-10-PCS

## 2018-04-16 PROCEDURE — 82746 ASSAY OF FOLIC ACID SERUM: CPT

## 2018-04-16 PROCEDURE — 82607 VITAMIN B-12: CPT

## 2018-04-16 PROCEDURE — 80053 COMPREHEN METABOLIC PANEL: CPT

## 2018-04-16 PROCEDURE — 85027 COMPLETE CBC AUTOMATED: CPT

## 2018-04-16 PROCEDURE — C9113 INJ PANTOPRAZOLE SODIUM, VIA: HCPCS

## 2018-04-16 PROCEDURE — 86850 RBC ANTIBODY SCREEN: CPT

## 2018-04-16 PROCEDURE — 85007 BL SMEAR W/DIFF WBC COUNT: CPT

## 2018-04-16 PROCEDURE — 88305 TISSUE EXAM BY PATHOLOGIST: CPT

## 2018-04-16 PROCEDURE — 86901 BLOOD TYPING SEROLOGIC RH(D): CPT

## 2018-04-16 PROCEDURE — 71045 X-RAY EXAM CHEST 1 VIEW: CPT

## 2018-04-16 PROCEDURE — 80048 BASIC METABOLIC PNL TOTAL CA: CPT

## 2018-04-16 PROCEDURE — P9016 RBC LEUKOCYTES REDUCED: HCPCS

## 2018-04-16 PROCEDURE — 86900 BLOOD TYPING SEROLOGIC ABO: CPT

## 2018-04-16 PROCEDURE — 83880 ASSAY OF NATRIURETIC PEPTIDE: CPT

## 2018-04-16 PROCEDURE — 83735 ASSAY OF MAGNESIUM: CPT

## 2018-04-16 PROCEDURE — 82948 REAGENT STRIP/BLOOD GLUCOSE: CPT

## 2018-04-16 NOTE — RADRPT
EXAM DATE/TIME:  04/16/2018 21:24 

 

[facility] COMPARISON:     

No previous studies available for comparison.

 

 

INDICATIONS :     

Low HGB with blood in stool.

                      

 

IV CONTRAST:     

95 cc Omnipaque 350 (iohexol) IV 

 

 

ORAL CONTRAST:      

No oral contrast ingested.

                      

 

RADIATION DOSE:     

8.42 CTDIvol (mGy) 

 

 

MEDICAL HISTORY :     

Gastroesophageal reflux disease.  Skin cancer

 

SURGICAL HISTORY :      

Appendectomy. 

 

ENCOUNTER:      

Initial

 

ACUITY:      

1 day

 

PAIN SCALE:      

0/10

 

LOCATION:         

abdomen

 

TECHNIQUE:     

Volumetric scanning of the abdomen and pelvis was performed.  Using automated exposure control and ad
justment of the mA and/or kV according to patient size, radiation dose was kept as low as reasonably 
achievable to obtain optimal diagnostic quality images.  DICOM format image data is available electro
nically for review and comparison.  

 

FINDINGS:     

 

LOWER LUNGS:     

There is patchy areas of consolidation seen in the medial left lower lobe.

 

LIVER:     

Homogeneous density without lesion.  There is no dilation of the biliary tree.  No calcified gallston
es.

 

SPLEEN:     

Normal size without lesion.

 

PANCREAS:     

Within normal limits.

 

KIDNEYS:     

Normal in size and shape.  There is no stone or hydronephrosis. There is cystic change in the central
 aspects of the kidneys bilaterally likely related to parapelvic cysts versus dilatation of the colle
cting system. The ureters are not dilated. This would suggest parapelvic cysts are more likely.

 

ADRENAL GLANDS:     

Within normal limits.

 

VASCULAR:     

There is no aortic aneurysm.

 

BOWEL/MESENTERY:     

There is a mild hiatal hernia. Bowel sutures are seen at the distal sigmoid colon region.

 

ABDOMINAL WALL:     

Within normal limits.

 

RETROPERITONEUM:     

There is no lymphadenopathy. 

 

BLADDER:     

No wall thickening or mass. 

 

REPRODUCTIVE:     

Within normal limits.

 

INGUINAL:     

There is no lymphadenopathy or hernia. 

 

MUSCULOSKELETAL:     

There is degenerative change in the lumbar spine.

 

CONCLUSION:     

1. Mild hiatal hernia.

2. Status post colonic surgery with a anastomosis suture in the sigmoid colon.

3. Mild patchy consolidation at the posterior medial left lower lobe.

4. 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

 

 

 

 Misha Barone MD on April 16, 2018 at 22:05           

Board Certified Radiologist.

 This report was verified electronically.

## 2018-04-16 NOTE — RADRPT
EXAM DATE/TIME:  04/16/2018 20:19 

 

[facility] COMPARISON:     

No previous studies available for comparison.

 

                     

INDICATIONS :     

Abnormal hemoglobin lab results.

                     

 

MEDICAL HISTORY :     

None.          

 

SURGICAL HISTORY :     

Total knee replacement, left.   

 

ENCOUNTER:     

Initial                                        

 

ACUITY:     

1 day      

 

PAIN SCORE:     

0/10

 

LOCATION:     

Bilateral chest 

 

FINDINGS:     

A single view of the chest demonstrates the lungs to be symmetrically aerated without evidence of mas
s, infiltrate or effusion.  The cardiomediastinal contours are unremarkable.  Osseous structures are 
intact.

 

CONCLUSION:     No acute disease.  

 

 

 

 Misha Barone MD on April 16, 2018 at 20:49           

Board Certified Radiologist.

 This report was verified electronically.

## 2018-04-16 NOTE — PD
HPI


Chief Complaint:  Anemia


Time Seen by Provider:  19:30


Travel History


International Travel<30 days:  No


Contact w/Intl Traveler<30days:  No





History of Present Illness


HPI


The patient is an 88 year old male who presents to the [facility] [address] emergency 

department with a history of being sent by his rehabilitation center for low 

hemoglobin.  The patient was noted to have screening blood work done that 

showed a hemoglobin reportedly of 4.7.  The patient reports feeling well 

overall.  The patient reports that on April 9 he underwent a left total knee 

replacement by Dr. Rabago.  The patient reports that he has not had a good 

appetite at the rehabilitation facility.  He reports that he is also had 

constipation.  His last bowel movement was reportedly 5 days ago.  He reports 

that he has noticed some dark stool.  He denies having any bright red blood in 

his stool.  He reports that he last had a colonoscopy approximately a year ago.

  He has had a partial colon resection related to 2 large polyps that were 

reportedly benign.  He denies having any abdominal pain.  He reports having 

some lightheaded sensation with standing and generalized weakness.  He denies 

having any chest pain, chest pressure, or shortness of breath.  On review of 

systems otherwise, he denies having any known recent fevers, cough or congestion

, neck pain, vomiting, diarrhea, urinary symptoms, or neurologic symptoms.  The 

patient is currently on Eliquis for DVT prevention





[facility]


Past Medical History


*** Narrative Medical


The patient's past medical history is significant for osteoarthritis, benign 

prostatic hypertrophy, acid reflux, history of thrombocytosis, history of 

memory loss, history of nonthrombocytopenic purpura, history of colon polyps, 

history of monoclonal gammopathy of undetermined significance.


Cancer:  Yes (skin)


Cardiovascular Problems:  No


Diabetes:  No


Endocrine:  No


Genitourinary:  Yes


Hiatal Hernia:  No


Immune Disorder:  No


Musculoskeletal:  No


Neurologic:  No


Psychiatric:  No


Reproductive:  No


Respiratory:  No


Thyroid Disease:  No





Past Surgical History


*** Narrative Surgical


The patient's past surgical history is significant for an appendectomy, partial 

colectomy, vasectomy, tonsillectomy.


Abdominal Surgery:  Yes (lap resection, appy)


AICD:  No


Joint Replacement:  No


Pacemaker:  No





Social History


Alcohol Use:  No


Tobacco Use:  No


Substance Use:  No





Allergies-Medications


(Allergen,Severity, Reaction):  


Coded Allergies:  


     No Known Allergies (Unverified , 4/2/18)


Reported Meds & Prescriptions





Reported Meds & Active Scripts


Active


Hydrocodone-Acetamin 7.5-325 (Hydrocodone/Acetaminophen) 7.5 Mg-325 Mg Tablet 1 

Tab PO Q4H PRN


Walker with Front Wheels (Device) 1 Mis Mis Ea .XX AS DIRECTED


Adjustable Commode 3-in-1 (Device) 1 Mis Mis Ea .XX AS DIRECTED


CPM-Continuous Passive Motion Machine 1 Ea Device Ea .XX AS DIRECTED


Reported


Diazepam 5 Mg Tab 5 Mg PO TID PRN


Tamsulosin (Tamsulosin HCl) 0.4 Mg Cap 0.4 Mg  HS


Omeprazole 20 Mg Tab 20 Mg PO DAILY


Vitamin B12 (Cyanocobalamin) 500 Mcg Tab 1,000 Mcg PO DAILY


Folic Acid 0.8 Mg Tab 2,000 Mcg PO DAILY








Review of Systems


Except as stated in HPI:  all other systems reviewed are Neg


General / Constitutional:  No: Fever


Eyes:  No: Visual changes


HENT:  Positive: Lightheadedness, No: Headaches, Congestion


Cardiovascular:  No: Chest Pain or Discomfort


Respiratory:  No: Shortness of Breath


Gastrointestinal:  Positive: Constipation, Changes in Bowel Habits, Loss of 

Appetite, No: Nausea, Vomiting, Diarrhea, Abdominal Pain, Indigestion


Genitourinary:  No: Dysuria


Musculoskeletal:  No: Pain


Skin:  No Rash


Neurologic:  Positive: Weakness (Generalized weakness), No: Focal Abnormalities

, Change in Mentation, Slurred Speech, Sensory Disturbance


Psychiatric:  No: Depression


Endocrine:  No: Polydipsia


Hematologic/Lymphatic:  No: Easy Bruising





Physical Exam


Narrative


General: 


The patient is a well-developed well-nourished male in no acute distress. 





Head and Neck exam: 


Head is normocephalic atraumatic. 


Eyes: EOMI, pupils are equal round and reactive to light. 


Nose: Midline septum with pink mucous membranes 


Mouth: Dentition unremarkable. Moist mucus membranes. Posterior oropharynx is 

not erythematous. No tonsillar hypertrophy. Uvula midline. Airway patent. 


Neck: No palpable lymphadenopathy. No nuchal rigidity. No thyromegaly. 





Cardiovascular: 


Regular rate and rhythm with a 2/6 systolic murmur best audible at the left 

anterior axillary line, nipple level.  No gallops or rubs.  No pulse deficit to 

the extremities on simultaneous auscultation and palpation of his radial 

artery. 





Lungs: 


Clear to auscultation bilaterally. No wheezes, rhonchi, or rales.


 


Abdomen:


Soft, without tenderness to palpation in all 4 quadrants of the abdomen. No 

guarding, rebound, or rigidity.  Normal bowel sounds are audible.  No 

tenderness on palpation of McBurney's point.





Extremities: 


No clubbing, cyanosis, or edema, except in the area of interest, the left knee 

where the patient has a recent wound from left knee replacement.  There is mild 

swelling, however no drainage. 2+ pulses in all 4 extremities.  No calf 

tenderness on palpation.





Back: 


No costovertebral angle tenderness to palpation. 





Neurologic Exam: Grossly nonfocal.





Skin Exam: No rash noted. Intact skin that is warm and dry. 





RECTAL EXAM: No masses or tenderness, stool is brown.  Stool is Hemoccult 

positive





Data


Data


Last Documented VS





Vital Signs








  Date Time  Temp Pulse Resp B/P (MAP) Pulse Ox O2 Delivery O2 Flow Rate FiO2


 


4/16/18 19:49   18  97 Room Air  


 


4/16/18 19:46 98.1 95  123/60 (81)    








Orders





 Orders


Electrocardiogram (4/16/18 19:43)


Complete Blood Count With Diff (4/16/18 19:43)


Comprehensive Metabolic Panel (4/16/18 19:43)


Creatine Kinase (Cpk) (4/16/18 19:43)


Ckmb (Isoenzyme) Profile (4/16/18 19:43)


Troponin I (4/16/18 19:43)


B-Type Natriuretic Peptide (4/16/18 19:43)


Prothrombin Time / Inr (Pt) (4/16/18 19:43)


Act Partial Throm Time (Ptt) (4/16/18 19:43)


Magnesium (Mg) (4/16/18 19:43)


Chest, Single Ap (4/16/18 19:43)


Ct Abd/Pel W Iv Contrast(Rout) (4/16/18 19:43)


Iv Access Insert/Monitor (4/16/18 19:43)


Ecg Monitoring (4/16/18 19:43)


Oximetry (4/16/18 19:43)


Type And Screen (4/16/18 19:43)


Red Blood Cells (Rbc) (4/16/18 19:43)


Acetamin-Hydrocod 325-7.5 Mg (Norco  7.5 (4/16/18 20:15)


CKMB (4/16/18 19:45)


CKMB% (4/16/18 19:45)


Blood Product Administration (4/16/18 21:08)


Sodium Chlor 0.9% 250 Ml Inj (Ns 250 Ml (4/16/18 21:15)


Admit Order (Ed Use Only) (4/16/18 22:13)


Admit To Inpatient (4/16/18 )


Inpatient Certification (4/16/18 )


Vital Signs (Adult) JALEESA.Q4H (4/16/18 22:13)


Diet Heart Healthy (4/17/18 Breakfast)


Activity Oob With Assistance (4/16/18 22:13)


High Fall Risk Interventions JALEESA.QEVE (4/16/18 22:13)


Consult Gastroenterology (4/16/18 )


Complete Blood Count With Diff (4/17/18 06:00)


Comprehensive Metabolic Panel (4/17/18 06:00)





Labs





Laboratory Tests








Test


  4/16/18


19:45


 


White Blood Count 13.4 TH/MM3 


 


Red Blood Count 1.32 MIL/MM3 


 


Hemoglobin 5.3 GM/DL 


 


Hematocrit 14.8 % 


 


Mean Corpuscular Volume 112.3 FL 


 


Mean Corpuscular Hemoglobin 40.1 PG 


 


Mean Corpuscular Hemoglobin


Concent 35.7 % 


 


 


Red Cell Distribution Width 14.4 % 


 


Platelet Count 499 TH/MM3 


 


Mean Platelet Volume 8.1 FL 


 


Neutrophils (%) (Auto) 72.9 % 


 


Lymphocytes (%) (Auto) 15.4 % 


 


Monocytes (%) (Auto) 9.6 % 


 


Eosinophils (%) (Auto) 1.5 % 


 


Basophils (%) (Auto) 0.6 % 


 


Neutrophils # (Auto) 9.7 TH/MM3 


 


Lymphocytes # (Auto) 2.1 TH/MM3 


 


Monocytes # (Auto) 1.3 TH/MM3 


 


Eosinophils # (Auto) 0.2 TH/MM3 


 


Basophils # (Auto) 0.1 TH/MM3 


 


CBC Comment AUTO DIFF 


 


Differential Total Cells


Counted 100 


 


 


Neutrophils % (Manual) 55 % 


 


Band Neutrophils % 1 % 


 


Lymphocytes % 20 % 


 


Monocytes % 11 % 


 


Eosinophils % 2 % 


 


Basophils % 1 % 


 


Neutrophils # (Manual) 8.8 TH/MM3 


 


Metamyelocytes 5 % 


 


Myelocytes 3 % 


 


Promyelocytes 2 % 


 


Differential Comment


  FINAL DIFF


MANUAL


 


Toxic Granulation 1+ 


 


Dohle Bodies PRESENT 


 


Platelet Estimate HIGH 


 


Platelet Morphology Comment NORMAL 


 


Polychromasia 3.2 % 


 


Spherocytes 2+ 


 


Ovalocytes 1+ 


 


Rouleau PRESENT 


 


Hematology Comments  


 


Prothrombin Time 11.1 SEC 


 


Prothromb Time International


Ratio 1.1 RATIO 


 


 


Activated Partial


Thromboplast Time 21.9 SEC 


 


 


Blood Urea Nitrogen 27 MG/DL 


 


Creatinine 1.57 MG/DL 


 


Random Glucose 114 MG/DL 


 


Total Protein 6.5 GM/DL 


 


Albumin 2.4 GM/DL 


 


Calcium Level 7.9 MG/DL 


 


Magnesium Level 2.4 MG/DL 


 


Alkaline Phosphatase 214 U/L 


 


Aspartate Amino Transf


(AST/SGOT) 86 U/L 


 


 


Alanine Aminotransferase


(ALT/SGPT) 88 U/L 


 


 


Total Bilirubin 1.1 MG/DL 


 


Sodium Level 133 MEQ/L 


 


Potassium Level 4.8 MEQ/L 


 


Chloride Level 100 MEQ/L 


 


Carbon Dioxide Level 26.1 MEQ/L 


 


Anion Gap 7 MEQ/L 


 


Estimat Glomerular Filtration


Rate 42 ML/MIN 


 


 


Total Creatine Kinase 383 U/L 


 


Creatine Kinase MB 1.7 NG/ML 


 


Creatine Kinase MB % 0.4 % 


 


Troponin I


  LESS THAN 0.02


NG/ML











MDM


Medical Decision Making


Medical Screen Exam Complete:  Yes


Emergency Medical Condition:  Yes


Medical Record Reviewed:  Yes


Interpretation(s)





Last Impressions








Chest X-Ray 4/16/18 1943 Signed





Impressions: 





 Service Date/Time:  Monday, April 16, 2018 20:19 - CONCLUSION: No acute 

disease. 





       Misha Barone MD 


 


Abdomen/Pelvis CT 4/16/18 1943 Signed





Impressions: 





 Service Date/Time:  Monday, April 16, 2018 21:24 - CONCLUSION:  1. Mild hiatal 





 hernia. 2. Status post colonic surgery with a anastomosis suture in the 

sigmoid 





 colon. 3. Mild patchy consolidation at the posterior medial left lower lobe. 

4. 





 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     Misha Barone MD 








Differential Diagnosis


Postoperative bleeding causing anemia, versus GI bleed on Eliquis


Narrative Course


During the course of the patient's emergency department visit, the patient's 

history, examination, and differential diagnosis were reviewed with the 

patient. The patient was placed on a cardiac monitor with oximetry and frequent 

blood pressure monitoring. The patient had  IV access obtained and blood work 

sent for analysis.  The patient is noted to be Hemoccult positive.  The patient 

has been on Eliquis.  The patient will be typed and crossmatched for blood 

administration.  The patient was agreeable to having blood administered.  The 

patient will be given 2 units of packed red blood cells.  The patient had an 

EKG done on arrival that shows a sinus rhythm with a first-degree AV block, QRS 

duration is 93 ms,  ms.  No acute ST segment elevation.





The patient's laboratory studies were reviewed and remarkable for a white count 

of 13.4, hemoglobin 5.3, platelets 499 with 11 monocytes, CMP is remarkable for 

BUN of 27, creatinine 1.57, sodium 133, glucose 114, total bilirubin 1.1, AST 86

, ALT 88, alk phos 214.   with a normal MB percent, troponin I less than 

0.02, albumin 2.4.  PT 11.1, PTT 21.9





Radiology studies were reviewed and remarkable for a chest x-ray that showed no 

acute evidence of cardiopulmonary disease, CT scan of the abdomen and pelvis 

showed a mild hiatal hernia, status post colonic surgery with an anastomosis 

suture in the sigmoid colon, mild patchy consolidation of the posterior medial 

left lower lobe, suspected renal parapelvic cysts.





The patient was started on 2 units of packed red blood cells for transfusion.  

I spoke to Dr. Lima regarding this patient's case.  He recommended that the 

patient's Eliquis be held.





The patient's results were discussed with the patient, including the plan of 

care. I explained that further testing and/ or monitoring is indicated based on 

the patient's history, examination, and/ or laboratory findings. Therefore, I 

recommended admission for additional evaluation. The patient expressed 

understanding and was agreeable with this plan. The patient was admitted to the 

hospital in guarded condition and sent to a bed under the care of the [facility] service.





[facility] Point of Care


Internal Pos. & Neg. Controls:  Passed


Fecal Specimen Occult Blood:  Positive





Physician Communication


Physician Communication


The patient's case including history, pertinent physical examination findings, 

and laboratory studies were discussed with Dr. Lima. [facility] 

[facility]





Diagnosis





 Primary Impression:  


 GI bleed


 Qualified Codes:  K92.2 - Gastrointestinal hemorrhage, unspecified


 Additional Impression:  


 Symptomatic anemia





Admitting Information


Admitting Physician Requests:  Admit











Leda Whipple MD Apr 16, 2018 19:51

## 2018-04-17 VITALS
RESPIRATION RATE: 20 BRPM | DIASTOLIC BLOOD PRESSURE: 67 MMHG | TEMPERATURE: 97.4 F | SYSTOLIC BLOOD PRESSURE: 167 MMHG | OXYGEN SATURATION: 96 % | HEART RATE: 75 BPM

## 2018-04-17 VITALS
OXYGEN SATURATION: 97 % | HEART RATE: 83 BPM | DIASTOLIC BLOOD PRESSURE: 54 MMHG | TEMPERATURE: 98.9 F | RESPIRATION RATE: 16 BRPM | SYSTOLIC BLOOD PRESSURE: 100 MMHG

## 2018-04-17 VITALS
RESPIRATION RATE: 20 BRPM | OXYGEN SATURATION: 95 % | HEART RATE: 76 BPM | DIASTOLIC BLOOD PRESSURE: 59 MMHG | TEMPERATURE: 97.8 F | SYSTOLIC BLOOD PRESSURE: 111 MMHG

## 2018-04-17 VITALS
RESPIRATION RATE: 16 BRPM | TEMPERATURE: 98.9 F | HEART RATE: 86 BPM | OXYGEN SATURATION: 96 % | DIASTOLIC BLOOD PRESSURE: 56 MMHG | SYSTOLIC BLOOD PRESSURE: 117 MMHG

## 2018-04-17 VITALS
HEART RATE: 69 BPM | DIASTOLIC BLOOD PRESSURE: 60 MMHG | RESPIRATION RATE: 18 BRPM | SYSTOLIC BLOOD PRESSURE: 125 MMHG | OXYGEN SATURATION: 96 % | TEMPERATURE: 98.1 F

## 2018-04-17 VITALS
HEART RATE: 86 BPM | TEMPERATURE: 98.3 F | OXYGEN SATURATION: 95 % | DIASTOLIC BLOOD PRESSURE: 58 MMHG | SYSTOLIC BLOOD PRESSURE: 122 MMHG | RESPIRATION RATE: 18 BRPM

## 2018-04-17 VITALS
HEART RATE: 72 BPM | OXYGEN SATURATION: 94 % | TEMPERATURE: 98.3 F | DIASTOLIC BLOOD PRESSURE: 69 MMHG | RESPIRATION RATE: 18 BRPM | SYSTOLIC BLOOD PRESSURE: 144 MMHG

## 2018-04-17 VITALS
RESPIRATION RATE: 18 BRPM | DIASTOLIC BLOOD PRESSURE: 57 MMHG | SYSTOLIC BLOOD PRESSURE: 120 MMHG | OXYGEN SATURATION: 94 % | HEART RATE: 81 BPM | TEMPERATURE: 99 F

## 2018-04-17 VITALS
RESPIRATION RATE: 18 BRPM | TEMPERATURE: 98.1 F | OXYGEN SATURATION: 96 % | DIASTOLIC BLOOD PRESSURE: 60 MMHG | HEART RATE: 69 BPM | SYSTOLIC BLOOD PRESSURE: 125 MMHG

## 2018-04-17 VITALS
HEART RATE: 75 BPM | RESPIRATION RATE: 18 BRPM | DIASTOLIC BLOOD PRESSURE: 63 MMHG | SYSTOLIC BLOOD PRESSURE: 137 MMHG | OXYGEN SATURATION: 99 % | TEMPERATURE: 97.7 F

## 2018-04-17 VITALS
OXYGEN SATURATION: 95 % | DIASTOLIC BLOOD PRESSURE: 59 MMHG | RESPIRATION RATE: 20 BRPM | HEART RATE: 76 BPM | SYSTOLIC BLOOD PRESSURE: 111 MMHG | TEMPERATURE: 97.8 F

## 2018-04-17 VITALS
HEART RATE: 83 BPM | SYSTOLIC BLOOD PRESSURE: 117 MMHG | TEMPERATURE: 98.9 F | RESPIRATION RATE: 16 BRPM | DIASTOLIC BLOOD PRESSURE: 56 MMHG | OXYGEN SATURATION: 96 %

## 2018-04-17 VITALS
HEART RATE: 66 BPM | DIASTOLIC BLOOD PRESSURE: 63 MMHG | OXYGEN SATURATION: 95 % | SYSTOLIC BLOOD PRESSURE: 126 MMHG | RESPIRATION RATE: 17 BRPM | TEMPERATURE: 98 F

## 2018-04-17 VITALS
HEART RATE: 78 BPM | RESPIRATION RATE: 18 BRPM | OXYGEN SATURATION: 98 % | DIASTOLIC BLOOD PRESSURE: 51 MMHG | SYSTOLIC BLOOD PRESSURE: 104 MMHG | TEMPERATURE: 98.8 F

## 2018-04-17 VITALS
DIASTOLIC BLOOD PRESSURE: 57 MMHG | HEART RATE: 89 BPM | SYSTOLIC BLOOD PRESSURE: 125 MMHG | RESPIRATION RATE: 18 BRPM | OXYGEN SATURATION: 99 % | TEMPERATURE: 98.3 F

## 2018-04-17 VITALS — HEART RATE: 69 BPM

## 2018-04-17 VITALS — HEART RATE: 73 BPM

## 2018-04-17 VITALS — HEART RATE: 77 BPM

## 2018-04-17 LAB
ALBUMIN SERPL-MCNC: 2.5 GM/DL (ref 3.4–5)
ALP SERPL-CCNC: 181 U/L (ref 45–117)
ALT SERPL-CCNC: 75 U/L (ref 12–78)
AST SERPL-CCNC: 57 U/L (ref 15–37)
BASOPHILS # BLD AUTO: 0.1 TH/MM3 (ref 0–0.2)
BASOPHILS NFR BLD: 0.6 % (ref 0–2)
BILIRUB SERPL-MCNC: 1 MG/DL (ref 0.2–1)
BUN SERPL-MCNC: 22 MG/DL (ref 7–18)
CALCIUM SERPL-MCNC: 8.1 MG/DL (ref 8.5–10.1)
CHLORIDE SERPL-SCNC: 102 MEQ/L (ref 98–107)
CREAT SERPL-MCNC: 1.48 MG/DL (ref 0.6–1.3)
EOSINOPHIL # BLD: 0.3 TH/MM3 (ref 0–0.4)
EOSINOPHIL NFR BLD: 2.1 % (ref 0–4)
ERYTHROCYTE [DISTWIDTH] IN BLOOD BY AUTOMATED COUNT: 16.1 % (ref 11.6–17.2)
GFR SERPLBLD BASED ON 1.73 SQ M-ARVRAT: 45 ML/MIN (ref 89–?)
GLUCOSE SERPL-MCNC: 97 MG/DL (ref 74–106)
HCO3 BLD-SCNC: 27.2 MEQ/L (ref 21–32)
HCT VFR BLD CALC: 19.8 % (ref 39–51)
HGB BLD-MCNC: 7.4 GM/DL (ref 13–17)
LYMPHOCYTES # BLD AUTO: 2.3 TH/MM3 (ref 1–4.8)
LYMPHOCYTES NFR BLD AUTO: 18.3 % (ref 9–44)
LYMPHOCYTES: 29 % (ref 9–44)
MCH RBC QN AUTO: 40.4 PG (ref 27–34)
MCHC RBC AUTO-ENTMCNC: 37.4 % (ref 32–36)
MCV RBC AUTO: 108.1 FL (ref 80–100)
METAMYELOCYTES NFR BLD: 4 % (ref 0–1)
MONOCYTE #: 1.4 TH/MM3 (ref 0–0.9)
MONOCYTES NFR BLD: 10.7 % (ref 0–8)
MONOCYTES: 9 % (ref 0–8)
MYELOCYTES NFR BLD: 2 % (ref 0–0)
NEUTROPHILS # BLD AUTO: 8.7 TH/MM3 (ref 1.8–7.7)
NEUTROPHILS NFR BLD AUTO: 68.3 % (ref 16–70)
NEUTS BAND # BLD MANUAL: 7.6 TH/MM3 (ref 1.8–7.7)
NEUTS BAND NFR BLD: 2 % (ref 0–6)
NEUTS SEG NFR BLD MANUAL: 51 % (ref 16–70)
NUCLEATED RED BLOOD CELL: 1 (ref 0–0)
PLATELET # BLD: 538 TH/MM3 (ref 150–450)
PMV BLD AUTO: 7.9 FL (ref 7–11)
POLYCHROMASIA BLD QL SMEAR: 2 % (ref 0–1.9)
PROT SERPL-MCNC: 6.3 GM/DL (ref 6.4–8.2)
RBC # BLD AUTO: 1.83 MIL/MM3 (ref 4.5–5.9)
SODIUM SERPL-SCNC: 137 MEQ/L (ref 136–145)
WBC # BLD AUTO: 12.8 TH/MM3 (ref 4–11)
WBC NRBC COR # BLD: 1 /100 WBC (ref 0–0)

## 2018-04-17 RX ADMIN — PANTOPRAZOLE SODIUM SCH MG: 40 INJECTION, POWDER, FOR SOLUTION INTRAVENOUS at 10:37

## 2018-04-17 RX ADMIN — PHENYTOIN SODIUM SCH MLS/HR: 50 INJECTION INTRAMUSCULAR; INTRAVENOUS at 21:57

## 2018-04-17 RX ADMIN — PHENYTOIN SODIUM SCH MLS/HR: 50 INJECTION INTRAMUSCULAR; INTRAVENOUS at 10:39

## 2018-04-17 RX ADMIN — HYDROCODONE BITARTRATE AND ACETAMINOPHEN PRN TAB: 7.5; 325 TABLET ORAL at 11:54

## 2018-04-17 RX ADMIN — TAMSULOSIN HYDROCHLORIDE SCH MG: 0.4 CAPSULE ORAL at 20:53

## 2018-04-17 RX ADMIN — PANTOPRAZOLE SODIUM SCH MG: 40 INJECTION, POWDER, FOR SOLUTION INTRAVENOUS at 20:53

## 2018-04-17 NOTE — PD.CONS
HPI


History of Present Illness


This is a 88 year old male with hx anemia, monoclonal gammopathy who presented 

to the ER for low hgb 4.7, sent from [facility] rehab.  He is s/p knee replacement 4 /9/18.  Hgb on admission was 5.3. Admits 1 episode black stool after his 

surgery and none since.  He has been constipated since his surgery.  Denies red 

blood in stool, black stool currently, abd pain, n/v.  Last colonoscopy 2 y ago 

by Dr Callahan with no abnormal findings.  He had partial colectomy 2001 for 

large polyp removal.  He had EGD 10y in [place] and can recall no further details.  

He takes eliquis, last had yesterday.  Denies hx liver problems, gastric ulcers

, GIB.  


 (Laurie Woodard)





PFSH


Past Medical History


large colon polyps


monoclonal gammopathy


Past Surgical History


partial colectomy


appendectomy


 (Laurie Woodard)


Coded Allergies:  


     No Known Allergies (Unverified , 4/2/18)


Family History


denies


Social History


2 cocktails daily


denies tobacco


 (Laurie Woodard)





Review of Systems


Constitutional:  DENIES: Fever


Endocrine:  DENIES: Polydipsia


Eyes:  DENIES: Blurred vision


Ears, nose, mouth, throat:  DENIES: Hearing loss


Respiratory:  DENIES: Cough


Cardiovascular:  DENIES: Chest pain


Gastrointestinal:  COMPLAINS OF: Constipation, DENIES: Abdominal pain, Black 

stools, Bloody stools, Nausea, Vomiting


Genitourinary:  DENIES: Hematuria


Musculoskeletal:  DENIES: Muscle aches


Integumentary:  DENIES: Abnormal pigmentation


Hematologic/lymphatic:  COMPLAINS OF: Bruising


Immunologic/allergic:  DENIES: Eczema


Neurologic:  COMPLAINS OF: Abnormal gait (s/p knee replacement left)


Psychiatric:  DENIES: Confusion (Laurie Woodard)





GI Exam


Vitals I&O





Vital Signs








  Date Time  Temp Pulse Resp B/P (MAP) Pulse Ox O2 Delivery O2 Flow Rate FiO2


 


4/17/18 07:49 98.3 86 18 122/58 (79) 95   


 


4/17/18 05:54 98.3 89 18 125/57 99   


 


4/17/18 03:21 98.8 78 18 104/51 98   


 


4/17/18 02:58 98.9 83 16 117/56 96   


 


4/17/18 02:53 98.9 86 16 117/56 96   


 


4/17/18 02:39 98.9 83 16 100/54 97   


 


4/16/18 23:20 98.4 85 16 109/56 98   


 


4/16/18 23:05 98.3 88 14 119/57 97   


 


4/16/18 19:49   18  97 Room Air  


 


4/16/18 19:46 98.1 95 16 123/60 (81) 97   














I/O      


 


 4/16/18 4/16/18 4/16/18 4/17/18 4/17/18 4/17/18





 07:00 15:00 23:00 07:00 15:00 23:00


 


Intake Total    800 ml  


 


Output Total    350 ml  


 


Balance    450 ml  


 


      


 


Intake Packed Cells    800 ml  


 


Output Urine Total    350 ml  








Imaging





Last Impressions








Chest X-Ray 4/16/18 1943 Signed





Impressions: 





 Service Date/Time:  Monday, April 16, 2018 20:19 - CONCLUSION: No acute 

disease. 





       Misha Barone MD 


 


Abdomen/Pelvis CT 4/16/18 1943 Signed





Impressions: 





 Service Date/Time:  Monday, April 16, 2018 21:24 - CONCLUSION:  1. Mild hiatal 





 hernia. 2. Status post colonic surgery with a anastomosis suture in the 

sigmoid 





 colon. 3. Mild patchy consolidation at the posterior medial left lower lobe. 

4. 





 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     Misha Barone MD 








Laboratory











Test


  4/16/18


19:45 4/17/18


05:45 4/17/18


05:55


 


White Blood Count 13.4 TH/MM3   12.8 TH/MM3 


 


Red Blood Count 1.32 MIL/MM3   1.83 MIL/MM3 


 


Hemoglobin 5.3 GM/DL   7.4 GM/DL 


 


Hematocrit 14.8 %   19.8 % 


 


Mean Corpuscular Volume 112.3 FL   108.1 FL 


 


Mean Corpuscular Hemoglobin 40.1 PG   40.4 PG 


 


Mean Corpuscular Hemoglobin


Concent 35.7 % 


  


  37.4 % 


 


 


Red Cell Distribution Width 14.4 %   16.1 % 


 


Platelet Count 499 TH/MM3   538 TH/MM3 


 


Mean Platelet Volume 8.1 FL   7.9 FL 


 


Neutrophils (%) (Auto) 72.9 %   68.3 % 


 


Lymphocytes (%) (Auto) 15.4 %   18.3 % 


 


Monocytes (%) (Auto) 9.6 %   10.7 % 


 


Eosinophils (%) (Auto) 1.5 %   2.1 % 


 


Basophils (%) (Auto) 0.6 %   0.6 % 


 


Neutrophils # (Auto) 9.7 TH/MM3   8.7 TH/MM3 


 


Lymphocytes # (Auto) 2.1 TH/MM3   2.3 TH/MM3 


 


Monocytes # (Auto) 1.3 TH/MM3   1.4 TH/MM3 


 


Eosinophils # (Auto) 0.2 TH/MM3   0.3 TH/MM3 


 


Basophils # (Auto) 0.1 TH/MM3   0.1 TH/MM3 


 


CBC Comment AUTO DIFF   AUTO DIFF 


 


Differential Total Cells


Counted 100 


  


  100 


 


 


Neutrophils % (Manual) 55 %   51 % 


 


Band Neutrophils % 1 %   2 % 


 


Lymphocytes % 20 %   29 % 


 


Monocytes % 11 %   9 % 


 


Eosinophils % 2 %   3 % 


 


Basophils % 1 %   


 


Neutrophils # (Manual) 8.8 TH/MM3   7.6 TH/MM3 


 


Metamyelocytes 5 %   4 % 


 


Myelocytes 3 %   2 % 


 


Promyelocytes 2 %   


 


Differential Comment


  FINAL DIFF


MANUAL 


  FINAL DIFF


MANUAL


 


Toxic Granulation 1+   


 


Dohle Bodies PRESENT   


 


Platelet Estimate HIGH   HIGH 


 


Platelet Morphology Comment NORMAL   NORMAL 


 


Polychromasia 3.2 %   2.0 % 


 


Spherocytes 2+   


 


Ovalocytes 1+   


 


Rouleau PRESENT   


 


Hematology Comments     


 


Prothrombin Time 11.1 SEC   


 


Prothromb Time International


Ratio 1.1 RATIO 


  


  


 


 


Activated Partial


Thromboplast Time 21.9 SEC 


  


  


 


 


Blood Urea Nitrogen 27 MG/DL  22 MG/DL  


 


Creatinine 1.57 MG/DL  1.48 MG/DL  


 


Random Glucose 114 MG/DL  97 MG/DL  


 


Total Protein 6.5 GM/DL  6.3 GM/DL  


 


Albumin 2.4 GM/DL  2.5 GM/DL  


 


Calcium Level 7.9 MG/DL  8.1 MG/DL  


 


Magnesium Level 2.4 MG/DL   


 


Alkaline Phosphatase 214 U/L  181 U/L  


 


Aspartate Amino Transf


(AST/SGOT) 86 U/L 


  57 U/L 


  


 


 


Alanine Aminotransferase


(ALT/SGPT) 88 U/L 


  75 U/L 


  


 


 


Total Bilirubin 1.1 MG/DL  1.0 MG/DL  


 


Sodium Level 133 MEQ/L  137 MEQ/L  


 


Potassium Level 4.8 MEQ/L  4.7 MEQ/L  


 


Chloride Level 100 MEQ/L  102 MEQ/L  


 


Carbon Dioxide Level 26.1 MEQ/L  27.2 MEQ/L  


 


Anion Gap 7 MEQ/L  8 MEQ/L  


 


Estimat Glomerular Filtration


Rate 42 ML/MIN 


  45 ML/MIN 


  


 


 


Total Creatine Kinase 383 U/L   


 


Creatine Kinase MB 1.7 NG/ML   


 


Creatine Kinase MB % 0.4 %   


 


Troponin I


  LESS THAN 0.02


NG/ML 


  


 


 


B-Type Natriuretic Peptide 162 PG/ML   


 


Nucleated Red Blood Cells   1 /100 WBC 








Physical Examination


HEENT: PERRL; normocephalic; atraumatic; no jaundice.   


CHEST:  CTA


CARDIAC:  RRR


ABDOMEN:  Soft,protuberant, nontender; no hepatosplenomegaly; bowel sounds are 

present in all four quadrants.


EXTREMITIES: No clubbing, cyanosis, or edema.  left knee incision approximated 

with glue


SKIN:  Normal; no rash; no jaundice.


CNS:  No focal deficits; alert and oriented times three.


 (Laurie Woodard)





Assessment and Plan


Plan


ASSESSMENT


- anemia, ? melena - hb 5.3 on admission. macrocytic.  hx monoclonal gammopathy

, followed by Dr Montoya.


   admits isolated episode black stool 1 week ago but none since.


   last colonoscopy 2 y ago, EGD 10y ago, no abnormal findings recalled.  heme 

pos stool.





PLAN


- EGD and colonoscopy in am


- obtain consent


- clears today


- GoLytely


- monitor labs


- transfuse as needed


- hold eliquis


- if EGD and colonoscopy negative, consider hematology consult


- further recs to follow


pt seen by myself and Dr Galan and this note is on her behalf


 (Laurie Woodard)


Physician Comments


seen, examined


agree with above


 (April Galan MD)











Laurie Woodard Apr 17, 2018 09:47


April Galan MD Apr 17, 2018 20:44

## 2018-04-17 NOTE — HHI.HP
HPI


Service


[facility] Hospitalists


Primary Care Physician


Colton Hartman MD


Admission Diagnosis





Symptomatic Anemia, GI bleed


Chief Complaint:  


abnormal blood wprk send by rehab center


Travel History


International Travel<30 Days:  No


Contact w/Intl Traveler <30 Da:  No


Traveled to Known Affected Are:  No


History of Present Illness


Mr. Ramos is a pleasant 88 y/o male with osteoarthritis, BPH, GERD and MGUS (

follows with Dr. Montoya). Pt was admitted to [facility] on 18 for left total 

knee arthroplasty with Dr. Rabago. Patient was DC to rehabilitation then 

patient presented to [facility] emergency department with a due to low 

hemoglobin.  Per report the CBC at rehab showed a hemoglobin reportedly of 4.7.

  He reports having some lightheaded sensation with standing and generalized 

weakness.  Patient endorses some dark stool.  He denies having any bright red 

blood in his stool.  He reports that he last had a colonoscopy approximately a 

year ago.  He has had a partial colon resection related to 2 large polyps that 

were reportedly benign.  He denies having any abdominal pain.  He denies having 

any chest pain, chest pressure, or shortness of breath, fevers, cough, 

congestion, neck pain, vomiting, diarrhea, urinary symptoms, or neurologic 

symptoms.  





The patient reports that he has not had a good appetite at the rehabilitation 

facility.  He reports that he is also had constipation.  His last bowel 

movement was reportedly 5 days ago.    The patient is currently on Eliquis for 

DVT prevention








Review of Systems


Constitutional:  COMPLAINS OF: Fatigue, Dizziness


Gastrointestinal:  COMPLAINS OF: Black stools





Past Family Social History


Past Medical History


Osteoarthritis


BPH


GERD


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


MGUS


Thrombocytosis


Memory loss


Nonthrombocytopenic purpura





Past Surgical History


18 for left total knee arthroplasty with Dr. Rabago


Appendectomy


Partial colectomy


Vasectomy


Tonsillectomy





Reported Medications


Hydrocodone-Acetamin 7.5-325 (Hydrocodone/Acetaminophen) 7.5 Mg-325 Mg Tablet 1 

Tab PO Q4H PRN


Diazepam 5 Mg Tab 5 Mg PO TID PRN


Tamsulosin (Tamsulosin HCl) 0.4 Mg Cap 0.4 Mg  HS


Omeprazole 20 Mg Tab 20 Mg PO DAILY


Vitamin B12 (Cyanocobalamin) 500 Mcg Tab 1,000 Mcg PO DAILY


Folic Acid 0.8 Mg Tab 2,000 Mcg PO DAILY


Eliquis


Allergies:  


Coded Allergies:  


     No Known Allergies (Unverified , 18)


Family History


Noncontributory


Social History


No reported alcohol, tobacco or illicit drug use





Physical Exam


Vital Signs





Vital Signs








  Date Time  Temp Pulse Resp B/P (MAP) Pulse Ox O2 Delivery O2 Flow Rate FiO2


 


18 05:54 98.3 89 18 125/57 99   


 


18 03:21 98.8 78 18 104/51 98   


 


18 02:58 98.9 83 16 117/56 96   


 


18 02:53 98.9 86 16 117/56 96   


 


18 02:39 98.9 83 16 100/54 97   


 


18 23:20 98.4 85 16 109/56 98   


 


18 23:05 98.3 88 14 119/57 97   


 


18 19:49   18  97 Room Air  


 


18 19:46 98.1 95 16 123/60 (81) 97   








Physical Exam


GENERAL: This is a well-nourished, well-developed patient, in no apparent 

distress.


SKIN: papula rash though out posterior thorax, healing incision left knee no 

drainage or redness


HEAD: Atraumatic. Normocephalic. No temporal or scalp tenderness.


EYES: Extraocular motions intact. No scleral icterus. No injection or drainage. 


CARDIOVASCULAR: Regular rate and rhythm wit murmur present


RESPIRATORY: Clear to auscultation. Breath sounds equal bilaterally. 


GASTROINTESTINAL: Abdomen soft, non-tender, nondistended. 


MUSCULOSKELETAL: Extremities without clubbing, cyanosis, or edema. No joint 

tenderness, effusion, or edema noted. No calf tenderness. Negative Homans sign 

bilaterally.


NEUROLOGICAL: Awake and alert. No focal deficits.  Motor and sensory grossly 

within normal limits. Five out of 5 muscle strength in all muscle groups.  

Normal speech.


Laboratory





Laboratory Tests








Test


  18


19:45 18


05:45 18


05:55


 


White Blood Count 13.4   12.8 


 


Red Blood Count 1.32   1.83 


 


Hemoglobin 5.3   7.4 


 


Hematocrit 14.8   19.8 


 


Mean Corpuscular Volume 112.3   108.1 


 


Mean Corpuscular Hemoglobin 40.1   40.4 


 


Mean Corpuscular Hemoglobin


Concent 35.7 


  


  37.4 


 


 


Red Cell Distribution Width 14.4   16.1 


 


Platelet Count 499   538 


 


Mean Platelet Volume 8.1   7.9 


 


Neutrophils (%) (Auto) 72.9   68.3 


 


Lymphocytes (%) (Auto) 15.4   18.3 


 


Monocytes (%) (Auto) 9.6   10.7 


 


Eosinophils (%) (Auto) 1.5   2.1 


 


Basophils (%) (Auto) 0.6   0.6 


 


Neutrophils # (Auto) 9.7   8.7 


 


Lymphocytes # (Auto) 2.1   2.3 


 


Monocytes # (Auto) 1.3   1.4 


 


Eosinophils # (Auto) 0.2   0.3 


 


Basophils # (Auto) 0.1   0.1 


 


CBC Comment AUTO DIFF   AUTO DIFF 


 


Differential Total Cells


Counted 100 


  


  


 


 


Neutrophils % (Manual) 55   


 


Band Neutrophils % 1   


 


Lymphocytes % 20   


 


Monocytes % 11   


 


Eosinophils % 2   


 


Basophils % 1   


 


Neutrophils # (Manual) 8.8   


 


Metamyelocytes 5   


 


Myelocytes 3   


 


Promyelocytes 2   


 


Differential Comment


  FINAL DIFF


MANUAL 


  


 


 


Toxic Granulation 1+   


 


Dohle Bodies PRESENT   


 


Platelet Estimate HIGH   


 


Platelet Morphology Comment NORMAL   


 


Polychromasia 3.2   


 


Spherocytes 2+   


 


Ovalocytes 1+   


 


Rouleau PRESENT   


 


Hematology Comments     


 


Prothrombin Time 11.1   


 


Prothromb Time International


Ratio 1.1 


  


  


 


 


Activated Partial


Thromboplast Time 21.9 


  


  


 


 


Blood Urea Nitrogen 27  22  


 


Creatinine 1.57  1.48  


 


Random Glucose 114  97  


 


Total Protein 6.5  6.3  


 


Albumin 2.4  2.5  


 


Calcium Level 7.9  8.1  


 


Magnesium Level 2.4   


 


Alkaline Phosphatase 214  181  


 


Aspartate Amino Transf


(AST/SGOT) 86 


  57 


  


 


 


Alanine Aminotransferase


(ALT/SGPT) 88 


  75 


  


 


 


Total Bilirubin 1.1  1.0  


 


Sodium Level 133  137  


 


Potassium Level 4.8  4.7  


 


Chloride Level 100  102  


 


Carbon Dioxide Level 26.1  27.2  


 


Anion Gap 7  8  


 


Estimat Glomerular Filtration


Rate 42 


  45 


  


 


 


Total Creatine Kinase 383   


 


Creatine Kinase MB 1.7   


 


Creatine Kinase MB % 0.4   


 


Troponin I LESS THAN 0.02   


 


B-Type Natriuretic Peptide 162   








Result Diagram:  


18 0555                                                                   

             18 0545





Imaging





Last Impressions








Chest X-Ray 18 Signed





Impressions: 





 Service Date/Time:  2018 20:19 - CONCLUSION: No acute 

disease. 





       Misha Barone MD 


 


Abdomen/Pelvis CT 18 Signed





Impressions: 





 Service Date/Time:  2018 21:24 - CONCLUSION:  1. Mild hiatal 





 hernia. 2. Status post colonic surgery with a anastomosis suture in the 

sigmoid 





 colon. 3. Mild patchy consolidation at the posterior medial left lower lobe. 

4. 





 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     John T. Carroll, MD Caprini VTE Risk Assessment


Caprini VTE Risk Assessment:  Mod/High Risk (score >= 2)


Caprini Risk Assessment Model











 Point Value = 1          Point Value = 2  Point Value = 3  Point Value = 5


 


Age 41-60


Minor surgery


BMI > 25 kg/m2


Swollen legs


Varicose veins


Pregnancy or postpartum


History of unexplained or recurrent


   spontaneous 


Oral contraceptives or hormone


   replacement


Sepsis (< 1 month)


Serious lung disease, including


   pneumonia (< 1 month)


Abnormal pulmonary function


Acute myocardial infarction


Congestive heart failure (< 1 month)


History of inflammatory bowel disease


Medical patient at bed rest Age 61-74


Arthroscopic surgery


Major open surgery (> 45 min)


Laparoscopic surgery (> 45 min)


Malignancy


Confined to bed (> 72 hours)


Immobilizing plaster cast


Central venous access Age >= 75


History of VTE


Family history of VTE


Factor V Leiden


Prothrombin 81871T


Lupus anticoagulant


Anticardiolipin antibodies


Elevated serum homocysteine


Heparin-induced thrombocytopenia


Other congenital or acquired


   thrombophilia Stroke (< 1 month)


Elective arthroplasty


Hip, pelvis, or leg fracture


Acute spinal cord injury (< 1 month)








Prophylaxis Regimen











   Total Risk


Factor Score Risk Level Prophylaxis Regimen


 


0-1      Low Early ambulation


 


2 Moderate Order ONE of the following:


*Sequential Compression Device (SCD)


*Heparin 5000 units SQ BID


 


3-4 Higher Order ONE of the following medications:


*Heparin 5000 units SQ TID


*Enoxaparin/Lovenox 40 mg SQ daily (WT < 150 kg, CrCl > 30 mL/min)


*Enoxaparin/Lovenox 30 mg SQ daily (WT < 150 kg, CrCl > 10-29 mL/min)


*Enoxaparin/Lovenox 30 mg SQ BID (WT < 150 kg, CrCl > 30 mL/min)


AND/OR


*Sequential Compression Device (SCD)


 


5 or more Highest Order ONE of the following medications:


*Heparin 5000 units SQ TID (Preferred with Epidurals)


*Enoxaparin/Lovenox 40 mg SQ daily (WT < 150 kg, CrCl > 30 mL/min)


*Enoxaparin/Lovenox 30 mg SQ daily (WT < 150 kg, CrCl > 10-29 mL/min)


*Enoxaparin/Lovenox 30 mg SQ BID (WT < 150 kg, CrCl > 30 mL/min)


AND


*Sequential Compression Device (SCD)











Assessment and Plan


Problem List:  


(1) Symptomatic anemia


ICD Codes:  D64.9 - Anemia, unspecified


Status:  Acute


Plan:  Symptomatic anemia


GI bleed


Mr. Ramos is a pleasant 88 y/o male with osteoarthritis, BPH, GERD and MGUS (

follows with Dr. Montoya). Pt was admitted to [facility] on 18 for left total 

knee arthroplasty with Dr. Rabago. Patient was DC to rehabilitation then 

patient presented to [facility] emergency department with a due to low 

hemoglobin.  


Hemoglobin on admission 5.3 --> 7.4 ()


2 units PRBC given will order addition 2 units PRBCs


positive occult stool in ER


Protonix 40 mg IV BID


IVF


Hold Eliquis


consult GI.  Patient has had a partial colon resection related to 2 large 

polyps that were reportedly benign.  





CT abdomen pelvis reviewed and revealed mild hiatal hernia.  Status post 

colonic surgery with an anastomosis suture in the sigmoid colon.  Mild patchy 

consolidation at the posterior medial left lower lobe.  Suspect renal 

parapelvic cyst.


Chest x-ray reveals no acute disease





Rash 


healing papular rash through out back


Benadryl cream TID as needed





Constipation


Patient may require prep for colonoscopy await GI eval and orders prior to 

adding laxatives


nikolai-colace 2 tabs PO BID


patient stated he would like to avoid narcotic pain medication if he is able


add acetaminophen 


continue Norco if needed





BPH


continue home flomax





MGUS (monoclonal gammopathy of unknown significance)


Chronic, follows with Dr. Montoya outpatient





DVT prophylaxis with SCDs avoid chemical DVT prophylaxes due to GI bleed





(2) GI bleed


ICD Codes:  K92.2 - Gastrointestinal hemorrhage, unspecified


Status:  Acute


(3) Constipation


ICD Codes:  K59.00 - Constipation, unspecified


(4) Rash


ICD Codes:  R21 - Rash and other nonspecific skin eruption


Assessment and Plan


Patient examined.


Assessment and plan formulated with Alessandra Valdovinos PA-C.


I agree with the above.


recent tka left. hgb drifted down and at [facility] found to be 5.


pt denies sob/dizziness to me. hasn't even had a bm. no abdomen pain.


he is receiving prbc's. stool guiac positive. going for egd/colon in AM


pt followed with hematology for mgus in past and has an appt.


callled and updated dr Rabago. hold anticoagulant. use scd


will plan back to snf once stabilized.





Physician Certification


2 Midnight Certification Type:  Admission for Inpatient Services


Order for Inpatient Services


The services are ordered in accordance with Medicare regulations or non-

Medicare payer requirements, as applicable.  In the case of services not 

specified as inpatient-only, they are appropriately provided as inpatient 

services in accordance with the 2-midnight benchmark.


Estimated LOS (days):  3


 days is the estimated time the patient will need to remain in the hospital, 

assuming treatment plan goals are met and no additional complications.


Post-Hospital Plan:  SNF





Problem Qualifiers





(1) GI bleed:  


Qualified Codes:  K92.2 - Gastrointestinal hemorrhage, unspecified








Alessandra Valdovinos 2018 07:55


Isai Delgado MD 2018 13:52

## 2018-04-17 NOTE — EKG
Date Performed: 2018       Time Performed: 19:41:38

 

PTAGE:      88 years

 

EKG:      Sinus rhythm 

 

 WITH FIRST DEGREE AV BLOCK ABNORMAL ECG INTERPRETATION BASED ON A DEFAULT AGE OF 40 YEARS Since the 


 

 PREVIOUS TRACING            , no significant change noted PREVIOUS TRACIN2000 09.29

 

DOCTOR:   Bay Sloan  Interpretating Date/Time  2018 15:13:31

## 2018-04-18 VITALS
RESPIRATION RATE: 18 BRPM | DIASTOLIC BLOOD PRESSURE: 61 MMHG | SYSTOLIC BLOOD PRESSURE: 131 MMHG | OXYGEN SATURATION: 94 % | TEMPERATURE: 98.3 F | HEART RATE: 70 BPM

## 2018-04-18 VITALS
OXYGEN SATURATION: 94 % | DIASTOLIC BLOOD PRESSURE: 59 MMHG | HEART RATE: 77 BPM | TEMPERATURE: 98.3 F | SYSTOLIC BLOOD PRESSURE: 118 MMHG

## 2018-04-18 VITALS
HEART RATE: 78 BPM | OXYGEN SATURATION: 96 % | SYSTOLIC BLOOD PRESSURE: 135 MMHG | TEMPERATURE: 98.4 F | DIASTOLIC BLOOD PRESSURE: 65 MMHG | RESPIRATION RATE: 18 BRPM

## 2018-04-18 VITALS
DIASTOLIC BLOOD PRESSURE: 63 MMHG | OXYGEN SATURATION: 94 % | HEART RATE: 77 BPM | TEMPERATURE: 97.7 F | SYSTOLIC BLOOD PRESSURE: 136 MMHG | RESPIRATION RATE: 20 BRPM

## 2018-04-18 VITALS
OXYGEN SATURATION: 95 % | RESPIRATION RATE: 17 BRPM | DIASTOLIC BLOOD PRESSURE: 56 MMHG | SYSTOLIC BLOOD PRESSURE: 126 MMHG | TEMPERATURE: 97.9 F | HEART RATE: 71 BPM

## 2018-04-18 VITALS — HEART RATE: 80 BPM

## 2018-04-18 VITALS — HEART RATE: 82 BPM

## 2018-04-18 VITALS
RESPIRATION RATE: 18 BRPM | HEART RATE: 79 BPM | DIASTOLIC BLOOD PRESSURE: 58 MMHG | SYSTOLIC BLOOD PRESSURE: 112 MMHG | OXYGEN SATURATION: 93 % | TEMPERATURE: 98.2 F

## 2018-04-18 LAB
% SATURATION IRON PROFILE: 26.8 % (ref 20–50)
BASOPHILS # BLD AUTO: 0 TH/MM3 (ref 0–0.2)
BASOPHILS NFR BLD: 0.4 % (ref 0–2)
BLASTS NFR BLD: 1 % (ref 0–0)
BUN SERPL-MCNC: 18 MG/DL (ref 7–18)
CALCIUM SERPL-MCNC: 7.4 MG/DL (ref 8.5–10.1)
CALCIUM TP COR SERPL-MCNC: 7.9 MG/DL (ref 8.5–10.1)
CHLORIDE SERPL-SCNC: 106 MEQ/L (ref 98–107)
CREAT SERPL-MCNC: 1.35 MG/DL (ref 0.6–1.3)
EOSINOPHIL # BLD: 0.2 TH/MM3 (ref 0–0.4)
EOSINOPHIL NFR BLD: 2 % (ref 0–4)
ERYTHROCYTE [DISTWIDTH] IN BLOOD BY AUTOMATED COUNT: 18.1 % (ref 11.6–17.2)
FOLATE SERPL-MCNC: (no result) NG/ML (ref 3.1–17.5)
GFR SERPLBLD BASED ON 1.73 SQ M-ARVRAT: 50 ML/MIN (ref 89–?)
GLUCOSE SERPL-MCNC: 123 MG/DL (ref 74–106)
HCO3 BLD-SCNC: 24.5 MEQ/L (ref 21–32)
HCT VFR BLD CALC: 23.7 % (ref 39–51)
HGB BLD-MCNC: 8.7 GM/DL (ref 13–17)
IRON (FE): 80 MCG/DL (ref 65–175)
LYMPHOCYTES # BLD AUTO: 1.5 TH/MM3 (ref 1–4.8)
LYMPHOCYTES NFR BLD AUTO: 15.2 % (ref 9–44)
LYMPHOCYTES: 17 % (ref 9–44)
MCH RBC QN AUTO: 37.4 PG (ref 27–34)
MCHC RBC AUTO-ENTMCNC: 36.8 % (ref 32–36)
MCV RBC AUTO: 101.8 FL (ref 80–100)
METAMYELOCYTES NFR BLD: 2 % (ref 0–1)
MONOCYTE #: 1 TH/MM3 (ref 0–0.9)
MONOCYTES NFR BLD: 9.7 % (ref 0–8)
MONOCYTES: 10 % (ref 0–8)
MYELOCYTES NFR BLD: 2 % (ref 0–0)
NEUTROPHILS # BLD AUTO: 7.2 TH/MM3 (ref 1.8–7.7)
NEUTROPHILS NFR BLD AUTO: 72.7 % (ref 16–70)
NEUTS BAND # BLD MANUAL: 7.1 TH/MM3 (ref 1.8–7.7)
NEUTS BAND NFR BLD: 2 % (ref 0–6)
NEUTS SEG NFR BLD MANUAL: 65 % (ref 16–70)
PLATELET # BLD: 432 TH/MM3 (ref 150–450)
PMV BLD AUTO: 7.4 FL (ref 7–11)
PROT SERPL-MCNC: 6.1 GM/DL (ref 6.4–8.2)
RBC # BLD AUTO: 2.32 MIL/MM3 (ref 4.5–5.9)
SODIUM SERPL-SCNC: 138 MEQ/L (ref 136–145)
TIBC SERPL-MCNC: 298 MCG/DL (ref 250–450)
TOXIC GRANULES BLD QL SMEAR: (no result)
VIT B12 SERPL-MCNC: (no result) PG/ML (ref 193–986)
WBC # BLD AUTO: 10 TH/MM3 (ref 4–11)

## 2018-04-18 PROCEDURE — 0DB78ZX EXCISION OF STOMACH, PYLORUS, VIA NATURAL OR ARTIFICIAL OPENING ENDOSCOPIC, DIAGNOSTIC: ICD-10-PCS | Performed by: INTERNAL MEDICINE

## 2018-04-18 PROCEDURE — 0DB98ZX EXCISION OF DUODENUM, VIA NATURAL OR ARTIFICIAL OPENING ENDOSCOPIC, DIAGNOSTIC: ICD-10-PCS | Performed by: INTERNAL MEDICINE

## 2018-04-18 PROCEDURE — 0DBP8ZX EXCISION OF RECTUM, VIA NATURAL OR ARTIFICIAL OPENING ENDOSCOPIC, DIAGNOSTIC: ICD-10-PCS | Performed by: INTERNAL MEDICINE

## 2018-04-18 PROCEDURE — 0DB58ZX EXCISION OF ESOPHAGUS, VIA NATURAL OR ARTIFICIAL OPENING ENDOSCOPIC, DIAGNOSTIC: ICD-10-PCS | Performed by: INTERNAL MEDICINE

## 2018-04-18 RX ADMIN — ACETAMINOPHEN PRN MG: 500 TABLET ORAL at 20:46

## 2018-04-18 RX ADMIN — PHENYTOIN SODIUM SCH MLS/HR: 50 INJECTION INTRAMUSCULAR; INTRAVENOUS at 09:24

## 2018-04-18 RX ADMIN — TAMSULOSIN HYDROCHLORIDE SCH MG: 0.4 CAPSULE ORAL at 20:46

## 2018-04-18 RX ADMIN — PANTOPRAZOLE SODIUM SCH MG: 40 INJECTION, POWDER, FOR SOLUTION INTRAVENOUS at 20:45

## 2018-04-18 RX ADMIN — HYDROCODONE BITARTRATE AND ACETAMINOPHEN PRN TAB: 7.5; 325 TABLET ORAL at 00:05

## 2018-04-18 RX ADMIN — PANTOPRAZOLE SODIUM SCH MG: 40 INJECTION, POWDER, FOR SOLUTION INTRAVENOUS at 09:25

## 2018-04-18 RX ADMIN — PHENYTOIN SODIUM SCH MLS/HR: 50 INJECTION INTRAMUSCULAR; INTRAVENOUS at 20:45

## 2018-04-18 NOTE — HHI.PR
Subjective


Remarks


pt and daughter report the stool coming out with bowel


prep looks black and tarry.





Objective


Vitals


heart reg


lung cta


abd s/nt


ext no edema


Vital Signs








  Date Time  Temp Pulse Resp B/P (MAP) Pulse Ox O2 Delivery O2 Flow Rate FiO2


 


4/18/18 08:00 97.9 69 17 126/56 (79) 95   


 


4/18/18 06:35 98.3 70 18 131/61 (84) 94   


 


4/18/18 05:10 97.7 77 20 136/63 (87) 94   


 


4/18/18 03:41  80      


 


4/18/18 00:00 98.2 79 18 112/58 (76) 93   


 


4/17/18 23:43  69      


 


4/17/18 22:15 97.4 75 20 167/67 96   


 


4/17/18 20:00 98.3 72 18 144/69 (94) 94   


 


4/17/18 19:47  77      


 


4/17/18 19:07 97.7 75 18 137/63 99   


 


4/17/18 16:00 98.0 66 17 126/63 (84) 95   


 


4/17/18 16:00  67      


 


4/17/18 15:52 97.8 76 20 111/59 (76) 95   


 


4/17/18 15:42 97.8 76 20 111/59 95   


 


4/17/18 15:23 98.1 69 18 125/60 96   


 


4/17/18 14:40 98.1 69 18 125/60 (81) 96   


 


4/17/18 14:28  73      


 


4/17/18 11:27 99.0 81 18 120/57 (78) 94   








Result Diagram:  


4/18/18 0440                                                                   

             4/17/18 0545





Imaging





Last Impressions








Chest X-Ray 4/16/18 1943 Signed





Impressions: 





 Service Date/Time:  Monday, April 16, 2018 20:19 - CONCLUSION: No acute 

disease. 





       Misha Barone MD 


 


Abdomen/Pelvis CT 4/16/18 1943 Signed





Impressions: 





 Service Date/Time:  Monday, April 16, 2018 21:24 - CONCLUSION:  1. Mild hiatal 





 hernia. 2. Status post colonic surgery with a anastomosis suture in the 

sigmoid 





 colon. 3. Mild patchy consolidation at the posterior medial left lower lobe. 

4. 





 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     Misha Barone MD 











A/P


Problem List:  


(1) Symptomatic anemia


ICD Codes:  D64.9 - Anemia, unspecified


Status:  Acute


Plan:  Symptomatic anemia


GI bleed


Mr. Ramos is a pleasant 88 y/o male with osteoarthritis, BPH, GERD and MGUS (

follows with Dr. Montoya). Pt was admitted to [facility] on 4/9/18 for left total 

knee arthroplasty with Dr. Rabago. Patient was DC to rehabilitation then 

patient presented to [facility] emergency department with a due to low 

hemoglobin.  





CT abdomen pelvis reviewed and revealed mild hiatal hernia.  Status post 

colonic surgery with an anastomosis suture in the sigmoid colon.  Mild patchy 

consolidation at the posterior medial left lower lobe.  Suspect renal 

parapelvic cyst.


Chest x-ray reveals no acute disease





Hemoglobin on admission 5.3 -


4 units PRBC given 


positive occult stool in ER


Protonix 40 mg IV BID


IVF


Hold Eliquis


consulted GI.  Patient has had a partial colon resection related to 2 large 

polyps that were reportedly benign.  


egd/colon pending for today.


plan back to snf once anemia issues are stabilized





Rash 


healing papular rash through out back


Benadryl cream TID as needed..pt says it is resolved








BPH


continue home flomax





MGUS (monoclonal gammopathy of unknown significance)


Chronic, follows with Dr. Montoya outpatient





DVT prophylaxis with SCDs avoid chemical DVT prophylaxes due to GI bleed





(2) GI bleed


ICD Codes:  K92.2 - Gastrointestinal hemorrhage, unspecified


Status:  Acute


(3) Constipation


ICD Codes:  K59.00 - Constipation, unspecified


(4) Rash


ICD Codes:  R21 - Rash and other nonspecific skin eruption





Problem Qualifiers





(1) GI bleed:  


Qualified Codes:  K92.2 - Gastrointestinal hemorrhage, unspecified








Isai Delgado MD Apr 18, 2018 09:49

## 2018-04-18 NOTE — GIPROC
[facility]

[address] [facility]. [facility], [address]

 

 

COLONOSCOPY PROCEDURE REPORT     EXAM DATE: 04/18/2018

 

PATIENT NAME:      Arthur Ramos           MR #:      [id number]

YOB: 1930      VISIT #:     [id number]

ENDOSCOPIST:     April Galan MD     ORDER #:     [id number]

ASSISTANT:      Dallin Boateng and Eleanor Grace     STATUS:     inpatient

 

 

INDICATIONS:  The patient is a 88 yr old male here for a colonoscopy due to

anemia, history of polyps

PROCEDURE PERFORMED:     Colonoscopy with biopsy

MEDICATIONS:     None and Per Anesthesia.

PREP QUALITY:     suboptimal PREP TYPE:Other:

ESTIMATED BLOOD LOSS:     None

 

CONSENT: The patient understands the risks and benefits of the procedure and

understands that these risks include, but are not limited to: sedation,

allergic reaction, infection, perforation and/or bleeding. Alternative means of

evaluation and treatment include, among others: physical exam, x-rays, and/or

surgical intervention. The patient elects to proceed with this endoscopic

procedure.

 



medical equipment was checked for proper function. Hand hygiene and appropriate

measures for infection prevention was taken. After the risks, benefits and

alternatives of the procedure were thoroughly explained, Informed consent was

verified, confirmed and timeout was successfully executed by the treatment

team. A digital exam revealed external hemorrhoids The Pentax [id number]

endoscope was introduced through the anus and advanced to the cecum, which was

identified by both the appendix and ileocecal valve. The instrument was then

slowly withdrawn as the colon was fully examined.

 

 

COLON FINDINGS: Polyp rectum

semisolid stool brown -agressive washing-no bleeding noted. Retroflexed views

revealed internal hemorrhoids and Retroflexed views revealed small internal

hemorrhoids The scope was then completely withdrawn from the patient and the

procedure terminated.

PROCEDURE WITHDRAWAL TIME:6minutes

 

 

 

ADVERSE EVENTS:      There were no complications.

IMPRESSIONS:     1.  Polyp rectum

semisolid stool brown -agressive washing-no bleeding noted

2.  Retroflexed views revealed internal hemorrhoids

3.  Retroflexed views revealed small internal hemorrhoids

4.  Revealed external hemorrhoids

 

RECOMMENDATIONS:     1.  Await biopsy results.  Biopsy results will not be ready

for 7-10 days.  If you don't hear from us in two weeks, call our office for

results.

2.  Benefiber 2 tsp daily

3.  Probiotics from any [facility] or health food store

4.  Capsule endoscopy

advance diet

hematology eval

ok to restart anticoagulation from gi point

RECALL:     Return 3 months Colonoscopy

 

_____________________________

April Galan MD

eSigned:  April Galan MD 04/18/2018 1:37 PM

 

 

cc:

 

 

 

 

PATIENT NAME:  Arthur Ramos

MR#: [id number]

## 2018-04-18 NOTE — GIPROC
[facility]

[address] [facility]. [facility], [address]

 

 

EGD PROCEDURE REPORT     EXAM DATE: 04/18/2018

 

PATIENT NAME:      Arthur Ramos           MR #:      [id number]

YOB: 1930      VISIT #:     [id number]

ATTENDING:     April Galan MD     ORDER #:     [id number]

ASSISTANT:      Dallin Boateng and Eleanor Grace     STATUS:     inpatient

 

 

INDICATIONS:  The patient is a 88 yr old male here for an EGD due to anemia

severe

PROCEDURE PERFORMED:     EGD w/ biopsy

MEDICATIONS:     None and Per Anesthesia.

TOPICAL ANESTHETIC:     none

 

CONSENT: The patient understands the risks and benefits of the procedure and

understands that these risks include, but are not limited to: sedation,

allergic reaction, infection, perforation and/or bleeding. Alternative means of

evaluation and treatment include, among others: physical exam, x-rays, and/or

surgical intervention. The patient elects to proceed with this endoscopic

procedure.

 



medical equipment was checked for proper function. Hand hygiene and appropriate

measures for infection prevention was taken. After the risks, benefits and

alternatives of the procedure were thoroughly explained, Informed consent was

verified, confirmed and timeout was successfully executed by the treatment

team. The patient was anesthetized with topical anesthesia and the EC-[id number]

(Pedi C) endoscope was introduced through the mouth and advanced to the second

portion of the duodenum.  Retroflexed views revealed a hiatal hernia  The

gastroscope was then slowly withdrawn and removed.

Duodenum normal-biopsy

gastritis antrum-biopsy

bhatt's esophagus-biopsy 30,28 cm

large hiatal hernia-10 cm.

 

 

 

ADVERSE EVENTS:     There were no complications.

IMPRESSIONS:     1.  Duodenum normal-biopsy

gastritis antrum-biopsy

bhatt's esophagus-biopsy 30,28 cm

large hiatal hernia-10 cm

2.  Retroflexed views revealed a hiatal hernia

 

RECOMMENDATIONS:     1.  Await biopsy results.  Biopsy results will not be ready

for 7-10 days.  If you don't hear from us in two weeks, call our office for

biopsy results.

2.  Anti-reflux regimen

3.  Continue PPI

4.  Start PPI

5.  Avoid NSAIDS

6.  Hematology consult

PATIENT CONDITION:     stable

DISPOSITION:     Inpatient

REPEAT EXAM:     Return 3 years EGD

 

 

___________________________________

April Galan MD

eSigned:  April Galan MD 04/18/2018 1:34 PM

 

 

cc:

 

 

 

 

PATIENT NAME:  Arthur Ramos

MR#: [id number]

## 2018-04-19 VITALS
RESPIRATION RATE: 18 BRPM | SYSTOLIC BLOOD PRESSURE: 156 MMHG | TEMPERATURE: 98.1 F | DIASTOLIC BLOOD PRESSURE: 65 MMHG | OXYGEN SATURATION: 96 % | HEART RATE: 72 BPM

## 2018-04-19 VITALS
DIASTOLIC BLOOD PRESSURE: 60 MMHG | OXYGEN SATURATION: 96 % | TEMPERATURE: 98.1 F | HEART RATE: 72 BPM | SYSTOLIC BLOOD PRESSURE: 156 MMHG | RESPIRATION RATE: 18 BRPM

## 2018-04-19 VITALS
DIASTOLIC BLOOD PRESSURE: 61 MMHG | OXYGEN SATURATION: 96 % | RESPIRATION RATE: 17 BRPM | HEART RATE: 69 BPM | SYSTOLIC BLOOD PRESSURE: 133 MMHG | TEMPERATURE: 97.9 F

## 2018-04-19 VITALS
HEART RATE: 79 BPM | OXYGEN SATURATION: 95 % | DIASTOLIC BLOOD PRESSURE: 58 MMHG | SYSTOLIC BLOOD PRESSURE: 123 MMHG | TEMPERATURE: 98.1 F | RESPIRATION RATE: 18 BRPM

## 2018-04-19 VITALS
SYSTOLIC BLOOD PRESSURE: 131 MMHG | TEMPERATURE: 97.2 F | OXYGEN SATURATION: 97 % | HEART RATE: 62 BPM | DIASTOLIC BLOOD PRESSURE: 63 MMHG | RESPIRATION RATE: 17 BRPM

## 2018-04-19 VITALS — HEART RATE: 77 BPM

## 2018-04-19 RX ADMIN — PANTOPRAZOLE SODIUM SCH MG: 40 INJECTION, POWDER, FOR SOLUTION INTRAVENOUS at 09:20

## 2018-04-19 RX ADMIN — ACETAMINOPHEN PRN MG: 500 TABLET ORAL at 12:13

## 2018-04-19 NOTE — MB
cc:

Vicki Montoya MD

****

 

 

DATE:

04/18/2018

 

REASON FOR CONSULTATION:

Consult requested by Dr. Galan for evaluation of anemia.

 

HISTORY OF PRESENT ILLNESS:

Arthur is a pleasant 88-year-old male.  He is well known to me for 

monoclonal gammopathy of unknown significance.  I last saw him 2 years

ago.  He lost for followup.

 

The patient recently underwent a left knee surgery by Dr. Velazquez on 

04/09.  He did well with the surgery and he was discharged to 

rehabilitation.  He was discharged on Eliquis after the knee surgery 

for prevention of DVT.

 

The patient stated that he noticed extreme weakness, tiredness, 

fatigue and black tarry stools.  A CBC was checked at the rehab and 

his hemoglobin was reported to be very low at 4.7.  He was rushed to 

the emergency room.

 

When he arrived in the emergency room 2 days ago CBC showed white 

count 13.4, hemoglobin 5.3, MCV was 112 and platelet count was 499.  

The patient was given blood transfusion and his hemoglobin yesterday 

improved to 7.4.  He had received 2 units of blood transfusion and 

today his hemoglobin has improved to 8.7.  He underwent a GI workup 

with Dr. Galan.  The colonoscopy showed a polyp in the rectum.  No 

bleeding noted.  He has internal hemorrhoids, which were not bleeding.

 The upper endoscopy also failed to show any bleeding.  Random 

biopsies of the duodenum and stomach were obtained.  He was found to 

have Barrera esophagus and large hiatal hernia.  Given that the GI 

procedure did not reveal the cause of the GI bleeding Dr. Galan is 

asking for hematology consult.

 

The patient states that he is feeling better after the blood 

transfusion.  His sister who works with the local urology group was 

present.  She also confirm that his stools were dark color.  Even when

he had the bowel preparation for colonoscopy the material was mixed 

with dark blood.  He had physical therapy and he was able to get up 

and sit on the chair.  He denies any nausea or vomiting.  He is 

complaining of swelling and pain in the left knee with where he had 

the surgery.

 

He had a CAT scan of the abdomen and pelvis, which does not show any 

evidence of retroperitoneal bleeding.  He has a mild hiatal hernia and

evidence of colonic surgery with anastomosis suture in the sigmoid 

colon.  The chest x-ray is negative.

 

PAST MEDICAL HISTORY:

IgM monoclonal gammopathy of unknown significance, history of iron 

deficiency anemia due to regular blood donation.  History of 

arthritis, basal cell skin cancer, BPH, colonic polyp, progressive 

gastroesophageal reflux disease, osteoporosis.

 

PAST SURGICAL HISTORY:

Appendectomy, for benign polyp, tonsillectomy, colonoscopy, vasectomy.

 

ALLERGIES:

NONE.

 

FAMILY HISTORY:

None for malignancy.

 

SOCIAL HISTORY:

The patient does not smoke cigarettes, but he continues to drink 

alcohol.  His daughter stated that he drinks 2 glasses every day.

 

PHYSICAL EXAMINATION:

GENERAL:  A well-developed, well-nourished white male, in no apparent 

distress.

VITAL SIGNS:  Temperature 98.3, heart rate of 77, blood pressure is 

118/59, O2 saturation 94%.

HEENT:  PERRLA. EOMI, anicteric.  No oral lesions noted.

NECK:  No lymphadenopathy noted.

LUNGS:  Clear.  No wheezing, rhonchi or rales.

HEART:  Regular rate and rhythm.

ABDOMEN:  Soft, nontender.  No hepatosplenomegaly.

EXTREMITIES:  Swelling and redness of the left knee noted.

NEUROLOGIC:  Awake, alert, oriented x 3.

SKIN:  No significant lesions noted.

 

ASSESSMENT:

1.  Severe significant anemia due to gastrointestinal bleeding with 

negative gastrointestinal workup so far.

2.  Macrocytic anemia, most likely due to alcoholism.

3.  Recent left knee surgery and was placed on Eliquis.

4.  Chronic renal insufficiency with a GFR of 42, then he was 

admitted.

 

PLAN:

I have reviewed his available records, and I had an extensive 

discussion with the patient and his daughter regarding the anemia.  

His hemoglobin was 12.3 on 04/02, that was pre blood test prior to the

knee surgery.  He underwent surgery on 04/09 and a day after the 

surgery on 04/10, his hemoglobin dropped to 9.4.  He was discharged to

home on 04/12 and his hemoglobin at that time was low at 8.0.  So he 

had dropped 4 grams of hemoglobin since the knee surgery.  Four days 

later, on 04/16 he came back to the emergency room with GI bleeding 

and his hemoglobin dropped to 5.3.  The patient had received 4 units 

of blood transfusion and his hemoglobin has improved to 8.7.  Clearly 

his anemia is due to surgical blood loss as well as GI bleeding, which

I suspect is from Eliquis.  The patient did not remember the dose of 

the Eliquis that he was prescribed or was taking.  His creatinine is 

elevated and GFR is low at 42.  Suspect that due to the renal 

insufficiency, he may have bleeding from the Eliquis.  The Eliquis has

been stopped.  The GI workup done by Dr. Galan failed to reveal the 

source.

 

I have recommended to check the B12 and folate given that the MCV was 

elevated at 112 when he came in to the emergency room.  No iron 

studies were done when he presented to the emergency room.  The 

patient already has received 4 units of blood transfusion.  I had 

called the lab and discussed with technician  Kelsy, to run the iron 

studies, iron profile and ferritin on the specimen, which was drawn in

the emergency room on 04/16 prior to getting blood transfusion.

 

We discussed that he could resume Eliquis at a lower dose at 2.5 mg 

twice a day, which is renal adjusted dose for prevention of DVT.

 

Daughter wanted physical therapist to come by again tomorrow and help 

him before sending the patient back to SNF.  We will ask the physical 

therapist to come by tomorrow also.

 

Further recommendation based on his hospital stay and the above test 

results, which have been ordered and the results are still pending.

 

Thank you Dr. Galna for asking my opinion.

 

 

__________________________________

Vikci Montoya MD

 

 

AJS/rt

D: 04/18/2018, 11:14 PM

T: 04/18/2018, 11:59 PM

Visit #: [id number]

Job #: [id number]

## 2018-04-19 NOTE — HHI.DCPOC
Discharge Care Plan


Diagnosis:  


(1) GI bleed


(2) Anemia


(3) Osteoarthritis of left knee


Goals to Promote Your Health


* To prevent worsening of your condition and complications


* To maintain your health at the optimal level


Directions to Meet Your Goals


*** Take your medications as prescribed


*** Follow your dietary instruction


*** Follow activity as directed








*** Keep your appointments as scheduled


*** Take your immunizations and boosters as scheduled


*** If your symptoms worsen call your PCP, if no PCP go to Urgent Care Center 

or Emergency Room***


*** Smoking is Dangerous to Your Health. Avoid second hand smoke***


***Call the 24-hour hour crisis hotline for domestic abuse at 1-937.237.6942***











Isai Delgado MD Apr 19, 2018 08:54

## 2018-04-19 NOTE — PD.ONC.PN
Subjective


Subjective


Remarks


Afebrile overnight. 


Patient resting in bed. 


daughter in law at bedside. 


has some mild pain in his post-surgical knee. 


denies bleeding.





Objective


Data











  Date Time  Temp Pulse Resp B/P (MAP) Pulse Ox O2 Delivery O2 Flow Rate FiO2


 


4/19/18 11:22 98.1 79 18 123/58 (79) 95   


 


4/19/18 09:15      Room Air  


 


4/19/18 08:23 98.1 72 18 156/60 (92) 96   


 


4/19/18 08:19 98.1 72 18 156/65 (95) 96   


 


4/19/18 08:00  77      


 


4/19/18 04:00  62      


 


4/19/18 04:00 97.2 69 17 131/63 (85) 97   


 


4/19/18 03:52      Room Air  


 


4/19/18 00:00      Room Air  


 


4/19/18 00:00 97.9 75 17 133/61 (85) 96   


 


4/19/18 00:00  69      


 


4/18/18 21:21 98.3 77  118/59 (78) 94   


 


4/18/18 20:00      Room Air  


 


4/18/18 20:00  82      


 


4/18/18 16:00 98.4 78 18 135/65 (88) 96   


 


4/18/18 16:00  77      


 


4/18/18 13:42 99.9 72 20 123/63 (83) 97   














 4/19/18 4/19/18 4/19/18





 07:00 15:00 23:00


 


Intake Total 360 ml  


 


Output Total 650 ml  


 


Balance -290 ml  








Result Diagram:  


4/18/18 0440                                                                   

             4/18/18 2049





Laboratory Results





Laboratory Tests








Test


  4/18/18


20:49


 


Blood Urea Nitrogen 18 MG/DL 


 


Creatinine 1.35 MG/DL 


 


Random Glucose 123 MG/DL 


 


Total Protein 6.1 GM/DL 


 


Calcium Level 7.4 MG/DL 


 


Sodium Level 138 MEQ/L 


 


Potassium Level 4.8 MEQ/L 


 


Chloride Level 106 MEQ/L 


 


Carbon Dioxide Level 24.5 MEQ/L 


 


Anion Gap 8 MEQ/L 


 


Estimat Glomerular Filtration


Rate 50 ML/MIN 


 


 


Protein Corrected Calcium 7.9 MG/DL 


 


Vitamin B12 Level


  GREATER THAN


2000 PG/ML


 


Folate


  GREATER THAN


20.0 NG/ML











Administered Medications








 Medications


  (Trade)  Dose


 Ordered  Sig/Lazarus


 Route


 PRN Reason  Start Time


 Stop Time Status Last Admin


Dose Admin


 


 Acetaminophen/


 Hydrocodone Bitart


  (Norco  7.5-325


 Mg)  1 tab  Q4H  PRN


 PO


 PAIN SCALE 6 TO 10  4/16/18 22:30


    4/18/18 00:05


 


 


 Tamsulosin HCl


  (Flomax)  0.4 mg  HS


 PO


   4/17/18 21:00


    4/18/18 20:46


 


 


 Pantoprazole


 Sodium


  (Protonix Inj)  40 mg  Q12H


 IV PUSH


   4/17/18 09:00


    4/19/18 09:20


 


 


 Acetaminophen


  (Tylenol)  500 mg  Q4H  PRN


 PO


 PAIN 1-5  4/17/18 08:45


    4/18/18 20:46


 


 


 Lactated Ringer's  1,000 ml @ 


 30 mls/hr  Q24H PRN


 IV


 SEE LABEL COMMENTS  4/18/18 10:30


 4/21/18 10:29  4/18/18 09:45


 








Objective Remarks


GENERAL: Elderly male, sitting up in chair next to bed in nad. 


SKIN: Warm and dry.


HEAD: Normocephalic.


EYES: No injection or drainage. 


NECK: Supple, trachea midline. 


CARDIOVASCULAR: Regular rate and rhythm


RESPIRATORY: Breath sounds equal bilaterally. No accessory muscle use.


GASTROINTESTINAL: Abdomen soft, non-tender, nondistended. 


EXTREMITIES: No cyanosis. left knee is post-surgically swollen with clean 

incision site and no erythema or bleeding. 


MUSCULOSKELETAL: Adequate muscle tone.


NEUROLOGICAL: awake and alert. normal speech





Assessment/Plan


Problem List:  


(1) Symptomatic anemia


ICD Codes:  D64.9 - Anemia, unspecified


Status:  Acute


Plan:   4/19: hgb stable. ok to d/c from hematology perspective. follow up in 

clinic. 


--EGD/colonoscopy -->+polyp in the rectum.  No bleeding noted.  + nonbleeding 

internal hemorrhoids


--CAT ab/pelvis--no evidence of retroperitoneal bleeding.  


--anemia due to surgical blood loss +GI bleeding, likely d/t Eliquis.  


--B12/folate show no deficiency


--anemia studies are within normal limits





Assessment


89y/o male s/p left knee surgery, admitted with anemia, black tarry stools.


h/o IgM monoclonal gammopathy of unknown significance, history of iron 


deficiency anemia due to regular blood donation.  History of 


arthritis, basal cell skin cancer, BPH, colonic polyp, progressive 


gastroesophageal reflux disease, osteoporosis.


Attending Statement


The exam, history, and the medical decision-making described in the above note 

were completed with the assistance of the mid-level provider. I reviewed and 

agree with the findings presented.  I attest that I had a face-to-face 

encounter with the patient on the same day, and personally performed and 

documented my assessment and findings in the medical record.





Feels better.


Less pain and swelling Left knee.


No more dark stool.


H/H stable . Ok to d/c to rehab


FU office.











Merle Banuelos Apr 19, 2018 11:51


Vicki Montoya MD Apr 19, 2018 12:16

## 2018-04-19 NOTE — HHI.DS
Discharge Summary


Admission Date


Apr 16, 2018 at 22:16


Discharge Date:  Apr 19, 2018


Admitting Diagnosis





Symptomatic Anemia, GI bleed





(1) Symptomatic anemia


Diagnosis:  Principal


ICD Codes:  D64.9 - Anemia, unspecified


Status:  Acute


(2) GI bleed


ICD Codes:  K92.2 - Gastrointestinal hemorrhage, unspecified


Status:  Acute


(3) Constipation


Diagnosis:  Secondary


ICD Codes:  K59.00 - Constipation, unspecified


(4) Rash


Diagnosis:  Secondary


ICD Codes:  R21 - Rash and other nonspecific skin eruption


Brief History


Mr. Ramos is a pleasant 88 y/o male with osteoarthritis, BPH, GERD and MGUS (

follows with Dr. Montoya). Pt was admitted to [facility] on 4/9/18 for left total 

knee arthroplasty with Dr. Rabago. Patient was DC to rehabilitation then 

patient presented to [facility] emergency department with a due to low 

hemoglobin.  Per report the CBC at rehab showed a hemoglobin reportedly of 4.7.

  He reports having some lightheaded sensation with standing and generalized 

weakness.  Patient endorses some dark stool.  He denies having any bright red 

blood in his stool.  He reports that he last had a colonoscopy approximately a 

year ago.  He has had a partial colon resection related to 2 large polyps that 

were reportedly benign.  He denies having any abdominal pain.  He denies having 

any chest pain, chest pressure, or shortness of breath, fevers, cough, 

congestion, neck pain, vomiting, diarrhea, urinary symptoms, or neurologic 

symptoms.  





The patient reports that he has not had a good appetite at the rehabilitation 

facility.  He reports that he is also had constipation.  His last bowel 

movement was reportedly 5 days ago.    The patient is currently on Eliquis for 

DVT prevention





CBC/BMP:  


4/18/18 0440                                                                   

             4/18/18 2049





Significant Findings





Laboratory Tests








Test


  4/16/18


19:45 4/17/18


05:45 4/17/18


05:55 4/18/18


04:40


 


White Blood Count


  13.4 TH/MM3


(4.0-11.0) 


  12.8 TH/MM3


(4.0-11.0) 


 


 


Red Blood Count


  1.32 MIL/MM3


(4.50-5.90) 


  1.83 MIL/MM3


(4.50-5.90) 2.32 MIL/MM3


(4.50-5.90)


 


Hemoglobin


  5.3 GM/DL


(13.0-17.0) 


  7.4 GM/DL


(13.0-17.0) 8.7 GM/DL


(13.0-17.0)


 


Hematocrit


  14.8 %


(39.0-51.0) 


  19.8 %


(39.0-51.0) 23.7 %


(39.0-51.0)


 


Mean Corpuscular Volume


  112.3 FL


(80.0-100.0) 


  108.1 FL


(80.0-100.0) 101.8 FL


(80.0-100.0)


 


Mean Corpuscular Hemoglobin


  40.1 PG


(27.0-34.0) 


  40.4 PG


(27.0-34.0) 37.4 PG


(27.0-34.0)


 


Platelet Count


  499 TH/MM3


(150-450) 


  538 TH/MM3


(150-450) 


 


 


Neutrophils (%) (Auto)


  72.9 %


(16.0-70.0) 


  


  72.7 %


(16.0-70.0)


 


Monocytes (%) (Auto)


  9.6 %


(0.0-8.0) 


  10.7 %


(0.0-8.0) 9.7 %


(0.0-8.0)


 


Neutrophils # (Auto)


  9.7 TH/MM3


(1.8-7.7) 


  8.7 TH/MM3


(1.8-7.7) 


 


 


Monocytes # (Auto)


  1.3 TH/MM3


(0-0.9) 


  1.4 TH/MM3


(0-0.9) 1.0 TH/MM3


(0-0.9)


 


Monocytes % 11 % (0-8)   9 % (0-8)  10 % (0-8) 


 


Neutrophils # (Manual)


  8.8 TH/MM3


(1.8-7.7) 


  


  


 


 


Metamyelocytes 5 % (0-1)   4 % (0-1)  2 % (0-1) 


 


Myelocytes 3 % (0-0)   2 % (0-0)  2 % (0-0) 


 


Promyelocytes 2 % (0-0)    


 


Toxic Granulation 1+ (NORMAL)    1+ (NORMAL) 


 


Dohle Bodies


  PRESENT (NONE


SEEN) 


  


  


 


 


Platelet Estimate HIGH (NORMAL)   HIGH (NORMAL)  HIGH (NORMAL) 


 


Polychromasia


  3.2 %


(0.0-1.9) 


  2.0 %


(0.0-1.9) 


 


 


Spherocytes 2+ (NORMAL)    


 


Ovalocytes 1+ (NORMAL)    


 


Rouleau


  PRESENT


(NORMAL) 


  


  


 


 


Activated Partial


Thromboplast Time 21.9 SEC


(24.3-30.1) 


  


  


 


 


Blood Urea Nitrogen


  27 MG/DL


(7-18) 22 MG/DL


(7-18) 


  


 


 


Creatinine


  1.57 MG/DL


(0.60-1.30) 1.48 MG/DL


(0.60-1.30) 


  


 


 


Random Glucose


  114 MG/DL


() 


  


  


 


 


Albumin


  2.4 GM/DL


(3.4-5.0) 2.5 GM/DL


(3.4-5.0) 


  


 


 


Calcium Level


  7.9 MG/DL


(8.5-10.1) 8.1 MG/DL


(8.5-10.1) 


  


 


 


Alkaline Phosphatase


  214 U/L


() 181 U/L


() 


  


 


 


Aspartate Amino Transf


(AST/SGOT) 86 U/L (15-37) 


  57 U/L (15-37) 


  


  


 


 


Alanine Aminotransferase


(ALT/SGPT) 88 U/L (12-78) 


  


  


  


 


 


Total Bilirubin


  1.1 MG/DL


(0.2-1.0) 


  


  


 


 


Sodium Level


  133 MEQ/L


(136-145) 


  


  


 


 


Estimat Glomerular Filtration


Rate 42 ML/MIN


(>89) 45 ML/MIN


(>89) 


  


 


 


Total Creatine Kinase


  383 U/L


() 


  


  


 


 


Troponin I


  LESS THAN 0.02


NG/ML 


  


  


 


 


B-Type Natriuretic Peptide


  162 PG/ML


(0-100) 


  


  


 


 


Total Protein


  


  6.3 GM/DL


(6.4-8.2) 


  


 


 


Mean Corpuscular Hemoglobin


Concent 


  


  37.4 %


(32.0-36.0) 36.8 %


(32.0-36.0)


 


Nucleated Red Blood Cells


  


  


  1 /100 WBC


(0-0) 


 


 


Red Cell Distribution Width


  


  


  


  18.1 %


(11.6-17.2)


 


Blastocytes    1 % (0-0) 


 


Test


  4/18/18


20:49 


  


  


 


 


Creatinine


  1.35 MG/DL


(0.60-1.30) 


  


  


 


 


Random Glucose


  123 MG/DL


() 


  


  


 


 


Total Protein


  6.1 GM/DL


(6.4-8.2) 


  


  


 


 


Calcium Level


  7.4 MG/DL


(8.5-10.1) 


  


  


 


 


Estimat Glomerular Filtration


Rate 50 ML/MIN


(>89) 


  


  


 


 


Protein Corrected Calcium


  7.9 MG/DL


(8.5-10.1) 


  


  


 


 


Vitamin B12 Level


  GREATER THAN


2000 PG/ML 


  


  


 


 


Folate


  GREATER THAN


20.0 NG/ML 


  


  


 








Hospital Course





(1) Symptomatic anemia


Symptomatic anemia


GI bleed


Mr. Ramos is a pleasant 88 y/o male with osteoarthritis, BPH, GERD and MGUS (

follows with Dr. Montoya). Pt was admitted to [facility] on 4/9/18 for left total 

knee arthroplasty with Dr. Rabago. Patient was DC to rehabilitation then 

patient presented to [facility] emergency department with a due to low 

hemoglobin.  





CT abdomen pelvis reviewed and revealed mild hiatal hernia.  Status post 

colonic surgery with an anastomosis suture in the sigmoid colon.  Mild patchy 

consolidation at the posterior medial left lower lobe.  Suspect renal 

parapelvic cyst.


Chest x-ray reveals no acute disease





Hemoglobin on admission 5.3 -


4 units PRBC given 


positive occult stool in ER


Protonix 40 mg IV BID


IVF were given


Hold Eliquis


consulted GI.  Patient has had a partial colon resection related to 2 large 

polyps that were reportedly benign in past.


EGD/Colonoscopy 4/18....gastritis/barretts/large HH. rectal polyp and int/ext 

hemorrhoids. 


Pt seen by hem/onc who feels anemia is related to surgery and gi losses from 

eliquis


pt eager for d/c back to snf. will dc and use scd's.





Rash 


healing papular rash through out back


Benadryl cream TID as needed..pt says it is resolved








BPH


continue home flomax





MGUS (monoclonal gammopathy of unknown significance)


Chronic, follows with Dr. Montoya outpatient


Pt Condition on Discharge:  Stable


Discharge Disposition:  Discharge to SNF


Discharge Instructions


DIET: Follow Instructions for:  As Tolerated, No Restrictions


Activities you can perform:  Regular-No Restrictions


Follow up Referrals:  


Orthopedics - 1 Week with TYLER Rabago MD





Continued Medications:  


Cyanocobalamin (Vitamin B12) 500 Mcg Tab


1000 MCG PO DAILY, #1 BOTTLE





Diazepam (Diazepam) 5 Mg Tab


5 MG PO TID PRN for ANXIETY, #20 TAB 0 Refills (This prescription has been 

renewed)





Folic Acid (Folic Acid) 0.8 Mg Tab


2000 MCG PO DAILY for Nutritional Supplement, TAB 0 Refills





Hydrocodone/Acetaminophen (Hydrocodone-Acetamin 7.5-325) 7.5 Mg-325 Mg Tablet


1 TAB PO Q4H PRN for pain, #20 TAB 0 Refills (This prescription has been renewed

)





Omeprazole (Omeprazole) 20 Mg Tab


20 MG PO DAILY, #30 TAB 0 Refills





Tamsulosin (Tamsulosin) 0.4 Mg Cap


0.4 MG HS for Manage Prostate Problems, #30 CAP 0 Refills

















Isai Delgado MD Apr 19, 2018 08:57

## 2018-04-20 ENCOUNTER — HOSPITAL ENCOUNTER (EMERGENCY)
Dept: HOSPITAL 17 - NEPC | Age: 83
LOS: 1 days | Discharge: HOME | End: 2018-04-21
Payer: MEDICARE

## 2018-04-20 VITALS
OXYGEN SATURATION: 99 % | TEMPERATURE: 98.2 F | DIASTOLIC BLOOD PRESSURE: 65 MMHG | SYSTOLIC BLOOD PRESSURE: 143 MMHG | RESPIRATION RATE: 20 BRPM | HEART RATE: 75 BPM

## 2018-04-20 VITALS
OXYGEN SATURATION: 97 % | SYSTOLIC BLOOD PRESSURE: 158 MMHG | HEART RATE: 72 BPM | DIASTOLIC BLOOD PRESSURE: 74 MMHG | RESPIRATION RATE: 21 BRPM

## 2018-04-20 VITALS — HEIGHT: 71 IN | WEIGHT: 176.37 LBS | BODY MASS INDEX: 24.69 KG/M2

## 2018-04-20 DIAGNOSIS — R53.81: ICD-10-CM

## 2018-04-20 DIAGNOSIS — R53.1: Primary | ICD-10-CM

## 2018-04-20 DIAGNOSIS — K21.9: ICD-10-CM

## 2018-04-20 LAB
ALBUMIN SERPL-MCNC: 2.7 GM/DL (ref 3.4–5)
ALP SERPL-CCNC: 130 U/L (ref 45–117)
ALT SERPL-CCNC: 52 U/L (ref 12–78)
AMORPHOUS SEDIMENT, URINE: (no result)
AST SERPL-CCNC: 43 U/L (ref 15–37)
BASOPHILS # BLD AUTO: 0.1 TH/MM3 (ref 0–0.2)
BASOPHILS NFR BLD: 0.7 % (ref 0–2)
BILIRUB SERPL-MCNC: 1.3 MG/DL (ref 0.2–1)
BUN SERPL-MCNC: 17 MG/DL (ref 7–18)
CALCIUM SERPL-MCNC: 8 MG/DL (ref 8.5–10.1)
CHLORIDE SERPL-SCNC: 109 MEQ/L (ref 98–107)
COLOR UR: YELLOW
CREAT SERPL-MCNC: 1.45 MG/DL (ref 0.6–1.3)
EOSINOPHIL # BLD: 0.3 TH/MM3 (ref 0–0.4)
EOSINOPHIL NFR BLD: 2.6 % (ref 0–4)
ERYTHROCYTE [DISTWIDTH] IN BLOOD BY AUTOMATED COUNT: 17.2 % (ref 11.6–17.2)
GFR SERPLBLD BASED ON 1.73 SQ M-ARVRAT: 46 ML/MIN (ref 89–?)
GLUCOSE SERPL-MCNC: 95 MG/DL (ref 74–106)
GLUCOSE UR STRIP-MCNC: (no result) MG/DL
HCO3 BLD-SCNC: 23.2 MEQ/L (ref 21–32)
HCT VFR BLD CALC: 24.6 % (ref 39–51)
HGB BLD-MCNC: 9.2 GM/DL (ref 13–17)
HGB UR QL STRIP: (no result)
INR PPP: 1.2 RATIO
KETONES UR STRIP-MCNC: (no result) MG/DL
LYMPHOCYTES # BLD AUTO: 1.5 TH/MM3 (ref 1–4.8)
LYMPHOCYTES NFR BLD AUTO: 13.7 % (ref 9–44)
LYMPHOCYTES: 12 % (ref 9–44)
MCH RBC QN AUTO: 38.2 PG (ref 27–34)
MCHC RBC AUTO-ENTMCNC: 37.3 % (ref 32–36)
MCV RBC AUTO: 102.4 FL (ref 80–100)
MONOCYTE #: 0.9 TH/MM3 (ref 0–0.9)
MONOCYTES NFR BLD: 8.6 % (ref 0–8)
MONOCYTES: 9 % (ref 0–8)
MUCOUS THREADS #/AREA URNS LPF: (no result) /LPF
NEUTROPHILS # BLD AUTO: 7.9 TH/MM3 (ref 1.8–7.7)
NEUTROPHILS NFR BLD AUTO: 74.4 % (ref 16–70)
NEUTS BAND # BLD MANUAL: 8.3 TH/MM3 (ref 1.8–7.7)
NEUTS BAND NFR BLD: 1 % (ref 0–6)
NEUTS SEG NFR BLD MANUAL: 77 % (ref 16–70)
NITRITE UR QL STRIP: (no result)
PLATELET # BLD: 568 TH/MM3 (ref 150–450)
PMV BLD AUTO: 7.7 FL (ref 7–11)
PROT SERPL-MCNC: 6.6 GM/DL (ref 6.4–8.2)
PROTHROMBIN TIME: 11.9 SEC (ref 9.8–11.6)
RBC # BLD AUTO: 2.4 MIL/MM3 (ref 4.5–5.9)
SODIUM SERPL-SCNC: 138 MEQ/L (ref 136–145)
SP GR UR STRIP: 1.01 (ref 1–1.03)
URINE LEUKOCYTE ESTERASE: (no result)
WBC # BLD AUTO: 10.6 TH/MM3 (ref 4–11)

## 2018-04-20 PROCEDURE — 85007 BL SMEAR W/DIFF WBC COUNT: CPT

## 2018-04-20 PROCEDURE — 84443 ASSAY THYROID STIM HORMONE: CPT

## 2018-04-20 PROCEDURE — 93005 ELECTROCARDIOGRAM TRACING: CPT

## 2018-04-20 PROCEDURE — 81001 URINALYSIS AUTO W/SCOPE: CPT

## 2018-04-20 PROCEDURE — 71045 X-RAY EXAM CHEST 1 VIEW: CPT

## 2018-04-20 PROCEDURE — 85730 THROMBOPLASTIN TIME PARTIAL: CPT

## 2018-04-20 PROCEDURE — 80053 COMPREHEN METABOLIC PANEL: CPT

## 2018-04-20 PROCEDURE — 85610 PROTHROMBIN TIME: CPT

## 2018-04-20 PROCEDURE — 85027 COMPLETE CBC AUTOMATED: CPT

## 2018-04-20 PROCEDURE — 99285 EMERGENCY DEPT VISIT HI MDM: CPT

## 2018-04-20 PROCEDURE — 84132 ASSAY OF SERUM POTASSIUM: CPT

## 2018-04-20 NOTE — RADRPT
EXAM DATE/TIME:  04/20/2018 22:04 

 

[facility] COMPARISON:     

CHEST SINGLE AP, April 16, 2018, 20:19.

 

                     

INDICATIONS :     

Shortness of breath

                     

 

MEDICAL HISTORY :            

Gastroesophageal reflux disease. Skin cancer   

 

SURGICAL HISTORY :     

Appendectomy.   

 

ENCOUNTER:     

Initial                                        

 

ACUITY:     

1 day      

 

PAIN SCORE:     

0/10

 

LOCATION:     

Bilateral chest 

 

FINDINGS:     

A single view of the chest demonstrates the lungs to be symmetrically aerated without evidence of mas
s, infiltrate or effusion.  The cardiomediastinal contours are unremarkable.  There is persistent mina
vation of the left hemidiaphragm. Osseous structures are intact.

 

CONCLUSION:     No acute disease.  

 

 

 

 Misha Barone MD on April 20, 2018 at 23:01           

Board Certified Radiologist.

 This report was verified electronically.

## 2018-04-20 NOTE — PD
HPI


Chief Complaint:  General Weakness


Time Seen by Provider:  21:23


Travel History


International Travel<30 days:  No


Contact w/Intl Traveler<30days:  No


Traveled to known affect area:  No





History of Present Illness


HPI


88-year-old male complains generalized malaise and weakness.  Patient states 

that symptoms started this afternoon.  Patient was admitted to [facility] 

April 16 and discharged yesterday with diagnosis of symptomatic anemia, GI 

bleed.  GI consulted.  EGD and colonoscopy done which showed gastritis Barrera'

s esophagus and large hiatal hernia.  Patient also was found to have rectal 

polyps and internal and external hemorrhoids.  Patient was seen by hematology 

oncologist and anemia was found to be related to surgery and GI loss from 

Eliquis.  Eliquis was stopped.  Patient was discharged home yesterday.  Patient 

was given 4 units of packed RBC transfusion prior to discharge.  Patient states 

that he was feeling fine until this afternoon when he started feeling 

generalized malaise and weakness.  Patient denies any headache.  Patient denies 

any chest pain or shortness of breath.  Patient denies abdominal pain.  Patient 

denies any nausea vomiting diarrhea.  Patient denies any dysuria or frequency.  

Patient denies any fever chills.





PFSH


Past Medical History


Cancer:  Yes (skin)


Cardiovascular Problems:  No


Diabetes:  No


Diminished Hearing:  Yes ([place])


Endocrine:  No


Gastrointestinal Disorders:  Yes (gerd)


Genitourinary:  Yes


Hiatal Hernia:  No


Immune Disorder:  No


Musculoskeletal:  No


Neurologic:  No


Psychiatric:  No


Reproductive:  No


Respiratory:  No


Thyroid Disease:  No





Past Surgical History


Abdominal Surgery:  Yes (lap resection, appy)


AICD:  No


Cardiac Surgery:  No


Ear Surgery:  No


Endocrine Surgery:  Yes (tonsillectomy)


Eye Surgery:  No


Genitourinary Surgery:  No


Gynecologic Surgery:  No


Joint Replacement:  Yes (LEFT KNEE)


Oral Surgery:  No


Pacemaker:  No


Thoracic Surgery:  No


Other Surgery:  Yes





Social History


Alcohol Use:  No


Tobacco Use:  No


Substance Use:  No





Allergies-Medications


(Allergen,Severity, Reaction):  


Coded Allergies:  


     No Known Allergies (Unverified , 4/20/18)


Reported Meds & Prescriptions





Reported Meds & Active Scripts


Active


Hydrocodone-Acetamin 7.5-325 (Hydrocodone/Acetaminophen) 7.5 Mg-325 Mg Tablet 1 

Tab PO Q4H PRN


Diazepam 5 Mg Tab 5 Mg PO TID PRN


Walker with Front Wheels (Device) 1 Mis Mis Ea .XX AS DIRECTED


Adjustable Commode 3-in-1 (Device) 1 Mis Mis Ea .XX AS DIRECTED


CPM-Continuous Passive Motion Machine 1 Ea Device Ea .XX AS DIRECTED


Reported


Tamsulosin (Tamsulosin HCl) 0.4 Mg Cap 0.4 Mg  HS


Omeprazole 20 Mg Tab 20 Mg PO DAILY


Vitamin B12 (Cyanocobalamin) 500 Mcg Tab 1,000 Mcg PO DAILY


Folic Acid 0.8 Mg Tab 2,000 Mcg PO DAILY








Review of Systems


General / Constitutional:  No: Fever


Eyes:  No: Visual changes


HENT:  No: Headaches


Cardiovascular:  No: Chest Pain or Discomfort


Respiratory:  No: Shortness of Breath


Gastrointestinal:  No: Abdominal Pain


Genitourinary:  No: Dysuria


Musculoskeletal:  No: Pain


Skin:  No Rash


Neurologic:  No: Weakness


Psychiatric:  No: Depression


Endocrine:  No: Polydipsia


Hematologic/Lymphatic:  No: Easy Bruising





Physical Exam


Narrative


GENERAL: Well-nourished, well-developed patient.


SKIN: Focused skin assessment warm/dry.


HEAD: Normocephalic.


EYES: No scleral icterus. No injection or drainage. 


NECK: Supple, trachea midline. No JVD or lymphadenopathy.


CARDIOVASCULAR: Regular rate and rhythm without murmurs, gallops, or rubs. 


RESPIRATORY: Breath sounds equal bilaterally. No accessory muscle use.


GASTROINTESTINAL: Abdomen soft, non-tender, nondistended.  Rectal exam 

Hemoccult negative.


MUSCULOSKELETAL: No cyanosis, or edema. 


BACK: Nontender without obvious deformity. No CVA tenderness.


Neurologic exam normal.





Data


Data


Last Documented VS





Vital Signs








  Date Time  Temp Pulse Resp B/P (MAP) Pulse Ox O2 Delivery O2 Flow Rate FiO2


 


4/20/18 21:26   20  98 Room Air  


 


4/20/18 21:21 98.2 75  143/65 (91)    








Orders





 Orders


Electrocardiogram (4/20/18 21:38)


Complete Blood Count With Diff (4/20/18 21:38)


Comprehensive Metabolic Panel (4/20/18 21:38)


Prothrombin Time / Inr (Pt) (4/20/18 21:38)


Act Partial Throm Time (Ptt) (4/20/18 21:38)


Urinalysis - C+S If Indicated (4/20/18 21:38)


Thyroid Stimulating Hormone (4/20/18 21:38)


Chest, Single Ap (4/20/18 21:38)


Iv Access Insert/Monitor (4/20/18 21:38)


Ecg Monitoring (4/20/18 21:38)


Oximetry (4/20/18 21:38)


Sodium Chlor 0.9% 1000 Ml Inj (Ns 1000 M (4/20/18 21:45)


Potassium, Serum (K) (4/21/18 00:19)





Labs





Laboratory Tests








Test


  4/20/18


21:30 4/20/18


22:30 4/21/18


00:25


 


White Blood Count 10.6 TH/MM3   


 


Red Blood Count 2.40 MIL/MM3   


 


Hemoglobin 9.2 GM/DL   


 


Hematocrit 24.6 %   


 


Mean Corpuscular Volume 102.4 FL   


 


Mean Corpuscular Hemoglobin 38.2 PG   


 


Mean Corpuscular Hemoglobin


Concent 37.3 % 


  


  


 


 


Red Cell Distribution Width 17.2 %   


 


Platelet Count 568 TH/MM3   


 


Mean Platelet Volume 7.7 FL   


 


Neutrophils (%) (Auto) 74.4 %   


 


Lymphocytes (%) (Auto) 13.7 %   


 


Monocytes (%) (Auto) 8.6 %   


 


Eosinophils (%) (Auto) 2.6 %   


 


Basophils (%) (Auto) 0.7 %   


 


Neutrophils # (Auto) 7.9 TH/MM3   


 


Lymphocytes # (Auto) 1.5 TH/MM3   


 


Monocytes # (Auto) 0.9 TH/MM3   


 


Eosinophils # (Auto) 0.3 TH/MM3   


 


Basophils # (Auto) 0.1 TH/MM3   


 


CBC Comment AUTO DIFF   


 


Differential Total Cells


Counted 100 


  


  


 


 


Neutrophils % (Manual) 77 %   


 


Band Neutrophils % 1 %   


 


Lymphocytes % 12 %   


 


Monocytes % 9 %   


 


Eosinophils % 1 %   


 


Neutrophils # (Manual) 8.3 TH/MM3   


 


Differential Comment


  FINAL DIFF


MANUAL 


  


 


 


Platelet Estimate HIGH   


 


Platelet Morphology Comment NORMAL   


 


Red Cell Morphology Comment NORMAL   


 


Prothrombin Time 11.9 SEC   


 


Prothromb Time International


Ratio 1.2 RATIO 


  


  


 


 


Activated Partial


Thromboplast Time 21.4 SEC 


  


  


 


 


Blood Urea Nitrogen 17 MG/DL   


 


Creatinine 1.45 MG/DL   


 


Random Glucose 95 MG/DL   


 


Total Protein 6.6 GM/DL   


 


Albumin 2.7 GM/DL   


 


Calcium Level 8.0 MG/DL   


 


Alkaline Phosphatase 130 U/L   


 


Aspartate Amino Transf


(AST/SGOT) 43 U/L 


  


  


 


 


Alanine Aminotransferase


(ALT/SGPT) 52 U/L 


  


  


 


 


Total Bilirubin 1.3 MG/DL   


 


Sodium Level 138 MEQ/L   


 


Potassium Level 6.2 MEQ/L   4.2 MEQ/L 


 


Chloride Level 109 MEQ/L   


 


Carbon Dioxide Level 23.2 MEQ/L   


 


Anion Gap 6 MEQ/L   


 


Estimat Glomerular Filtration


Rate 46 ML/MIN 


  


  


 


 


Thyroid Stimulating Hormone


3rd Gen 3.270 uIU/ML 


  


  


 


 


Urine Color  YELLOW  


 


Urine Turbidity  CLEAR  


 


Urine pH  7.5  


 


Urine Specific Gravity  1.010  


 


Urine Protein  NEG mg/dL  


 


Urine Glucose (UA)  NEG mg/dL  


 


Urine Ketones  NEG mg/dL  


 


Urine Occult Blood  NEG  


 


Urine Nitrite  NEG  


 


Urine Bilirubin  NEG  


 


Urine Urobilinogen


  


  LESS THAN 2.0


MG/DL 


 


 


Urine Leukocyte Esterase  NEG  


 


Urine WBC


  


  LESS THAN 1


/hpf 


 


 


Urine Amorphous Sediment  RARE  


 


Urine Mucus  FEW /lpf  


 


Microscopic Urinalysis Comment


  


  CULT NOT


INDICATED 


 











MDM


Medical Decision Making


Medical Screen Exam Complete:  Yes


Emergency Medical Condition:  Yes


Interpretation(s)





Last Impressions








Chest X-Ray 4/20/18 2138 Signed





Impressions: 





 Service Date/Time:  Friday, April 20, 2018 22:04 - CONCLUSION: No acute 

disease. 





       Misha Barone MD 





CBC WBC 10.6.  Hemoglobin 9.2 hematocrit 34.6.  .4.  Patient at 

baseline.  Potassium 6.2.  Slight hemolysis noted.  BUN 17.  Creatinine 1.45.  

Patient at baseline.  Calcium 8.0.  Total bili 1.3.  UA is negative.


1:32 AM.  Repeated potassium 4.2.


Differential Diagnosis


Differential diagnosis including viral syndrome, electrolyte abnormality, anemia

, dehydration.


Narrative Course


88-year-old male with generalized malaise and weakness.  History of recent GI 

bleed that required blood transfusion.





Diagnosis





 Primary Impression:  


 Weakness


Patient Instructions:  General Instructions





***Additional Instructions:  


Continue with medications as directed.  Follow-up with personal physician.  

Return if worse.


***Med/Other Pt SpecificInfo:  No Change to Meds


Disposition:  01 DISCHARGE HOME


Condition:  Stable











Eloy Sandy MD Apr 20, 2018 21:45

## 2018-04-21 VITALS
RESPIRATION RATE: 21 BRPM | SYSTOLIC BLOOD PRESSURE: 156 MMHG | HEART RATE: 72 BPM | DIASTOLIC BLOOD PRESSURE: 70 MMHG | OXYGEN SATURATION: 98 %

## 2018-04-21 VITALS
SYSTOLIC BLOOD PRESSURE: 151 MMHG | DIASTOLIC BLOOD PRESSURE: 72 MMHG | HEART RATE: 70 BPM | RESPIRATION RATE: 20 BRPM | OXYGEN SATURATION: 97 %

## 2018-05-28 ENCOUNTER — HOSPITAL ENCOUNTER (INPATIENT)
Dept: HOSPITAL 17 - NEPE | Age: 83
LOS: 4 days | Discharge: NURSING, REHAB OR LONG-TERM CARE | DRG: 481 | End: 2018-06-01
Attending: HOSPITALIST | Admitting: HOSPITALIST
Payer: MEDICARE

## 2018-05-28 VITALS — WEIGHT: 175.49 LBS | BODY MASS INDEX: 24.57 KG/M2 | HEIGHT: 71 IN

## 2018-05-28 VITALS
TEMPERATURE: 97.8 F | HEART RATE: 72 BPM | OXYGEN SATURATION: 97 % | RESPIRATION RATE: 18 BRPM | SYSTOLIC BLOOD PRESSURE: 130 MMHG | DIASTOLIC BLOOD PRESSURE: 60 MMHG

## 2018-05-28 VITALS
RESPIRATION RATE: 18 BRPM | HEART RATE: 87 BPM | OXYGEN SATURATION: 98 % | DIASTOLIC BLOOD PRESSURE: 69 MMHG | SYSTOLIC BLOOD PRESSURE: 148 MMHG

## 2018-05-28 VITALS
HEART RATE: 79 BPM | OXYGEN SATURATION: 99 % | TEMPERATURE: 98.1 F | SYSTOLIC BLOOD PRESSURE: 130 MMHG | RESPIRATION RATE: 18 BRPM | DIASTOLIC BLOOD PRESSURE: 60 MMHG

## 2018-05-28 VITALS
SYSTOLIC BLOOD PRESSURE: 137 MMHG | DIASTOLIC BLOOD PRESSURE: 65 MMHG | RESPIRATION RATE: 19 BRPM | TEMPERATURE: 98 F | OXYGEN SATURATION: 100 % | HEART RATE: 71 BPM

## 2018-05-28 VITALS
RESPIRATION RATE: 18 BRPM | OXYGEN SATURATION: 97 % | HEART RATE: 74 BPM | DIASTOLIC BLOOD PRESSURE: 58 MMHG | SYSTOLIC BLOOD PRESSURE: 123 MMHG

## 2018-05-28 VITALS
TEMPERATURE: 97.7 F | SYSTOLIC BLOOD PRESSURE: 108 MMHG | HEART RATE: 77 BPM | DIASTOLIC BLOOD PRESSURE: 55 MMHG | OXYGEN SATURATION: 98 % | RESPIRATION RATE: 18 BRPM

## 2018-05-28 VITALS — OXYGEN SATURATION: 98 %

## 2018-05-28 DIAGNOSIS — N40.0: ICD-10-CM
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
## 2018-05-28 DIAGNOSIS — Z96.652: ICD-10-CM

## 2018-05-28 DIAGNOSIS — K21.9: ICD-10-CM

## 2018-05-28 DIAGNOSIS — D47.2: ICD-10-CM

## 2018-05-28 DIAGNOSIS — S72.141A: Primary | ICD-10-CM

## 2018-05-28 DIAGNOSIS — M19.90: ICD-10-CM

## 2018-05-28 DIAGNOSIS — D62: ICD-10-CM

## 2018-05-28 LAB
ALBUMIN SERPL-MCNC: 3.1 GM/DL (ref 3.4–5)
ALP SERPL-CCNC: 142 U/L (ref 45–117)
ALT SERPL-CCNC: 16 U/L (ref 12–78)
AST SERPL-CCNC: 17 U/L (ref 15–37)
BASOPHILS # BLD AUTO: 0 TH/MM3 (ref 0–0.2)
BASOPHILS NFR BLD: 0.5 % (ref 0–2)
BILIRUB SERPL-MCNC: 1.9 MG/DL (ref 0.2–1)
BUN SERPL-MCNC: 17 MG/DL (ref 7–18)
CALCIUM SERPL-MCNC: 8.1 MG/DL (ref 8.5–10.1)
CHLORIDE SERPL-SCNC: 107 MEQ/L (ref 98–107)
CREAT SERPL-MCNC: 1.27 MG/DL (ref 0.6–1.3)
EOSINOPHIL # BLD: 0 TH/MM3 (ref 0–0.4)
EOSINOPHIL NFR BLD: 0.1 % (ref 0–4)
ERYTHROCYTE [DISTWIDTH] IN BLOOD BY AUTOMATED COUNT: 18.2 % (ref 11.6–17.2)
GFR SERPLBLD BASED ON 1.73 SQ M-ARVRAT: 54 ML/MIN (ref 89–?)
GLUCOSE SERPL-MCNC: 105 MG/DL (ref 74–106)
HCO3 BLD-SCNC: 27.4 MEQ/L (ref 21–32)
HCT VFR BLD CALC: 25.4 % (ref 39–51)
HGB BLD-MCNC: 8.9 GM/DL (ref 13–17)
INR PPP: 1.1 RATIO
LYMPHOCYTES # BLD AUTO: 0.5 TH/MM3 (ref 1–4.8)
LYMPHOCYTES NFR BLD AUTO: 7.7 % (ref 9–44)
MCH RBC QN AUTO: 36.1 PG (ref 27–34)
MCHC RBC AUTO-ENTMCNC: 34.9 % (ref 32–36)
MCV RBC AUTO: 103.5 FL (ref 80–100)
MONOCYTE #: 0.6 TH/MM3 (ref 0–0.9)
MONOCYTES NFR BLD: 9 % (ref 0–8)
NEUTROPHILS # BLD AUTO: 5.9 TH/MM3 (ref 1.8–7.7)
NEUTROPHILS NFR BLD AUTO: 82.7 % (ref 16–70)
PLATELET # BLD: 283 TH/MM3 (ref 150–450)
PMV BLD AUTO: 7.9 FL (ref 7–11)
PROT SERPL-MCNC: 6.6 GM/DL (ref 6.4–8.2)
PROTHROMBIN TIME: 11.6 SEC (ref 9.8–11.6)
RBC # BLD AUTO: 2.46 MIL/MM3 (ref 4.5–5.9)
SODIUM SERPL-SCNC: 141 MEQ/L (ref 136–145)
WBC # BLD AUTO: 7.1 TH/MM3 (ref 4–11)

## 2018-05-28 PROCEDURE — 36430 TRANSFUSION BLD/BLD COMPNT: CPT

## 2018-05-28 PROCEDURE — 85027 COMPLETE CBC AUTOMATED: CPT

## 2018-05-28 PROCEDURE — 85007 BL SMEAR W/DIFF WBC COUNT: CPT

## 2018-05-28 PROCEDURE — 86850 RBC ANTIBODY SCREEN: CPT

## 2018-05-28 PROCEDURE — 86920 COMPATIBILITY TEST SPIN: CPT

## 2018-05-28 PROCEDURE — 85025 COMPLETE CBC W/AUTO DIFF WBC: CPT

## 2018-05-28 PROCEDURE — 85014 HEMATOCRIT: CPT

## 2018-05-28 PROCEDURE — 85730 THROMBOPLASTIN TIME PARTIAL: CPT

## 2018-05-28 PROCEDURE — 83735 ASSAY OF MAGNESIUM: CPT

## 2018-05-28 PROCEDURE — 85018 HEMOGLOBIN: CPT

## 2018-05-28 PROCEDURE — 73564 X-RAY EXAM KNEE 4 OR MORE: CPT

## 2018-05-28 PROCEDURE — 73502 X-RAY EXAM HIP UNI 2-3 VIEWS: CPT

## 2018-05-28 PROCEDURE — C1713 ANCHOR/SCREW BN/BN,TIS/BN: HCPCS

## 2018-05-28 PROCEDURE — 76000 FLUOROSCOPY <1 HR PHYS/QHP: CPT

## 2018-05-28 PROCEDURE — L1830 KO IMMOB CANVAS LONG PRE OTS: HCPCS

## 2018-05-28 PROCEDURE — 96374 THER/PROPH/DIAG INJ IV PUSH: CPT

## 2018-05-28 PROCEDURE — 94150 VITAL CAPACITY TEST: CPT

## 2018-05-28 PROCEDURE — 80048 BASIC METABOLIC PNL TOTAL CA: CPT

## 2018-05-28 PROCEDURE — 86922 COMPATIBILITY TEST ANTIGLOB: CPT

## 2018-05-28 PROCEDURE — 80053 COMPREHEN METABOLIC PANEL: CPT

## 2018-05-28 PROCEDURE — 85610 PROTHROMBIN TIME: CPT

## 2018-05-28 PROCEDURE — 86900 BLOOD TYPING SEROLOGIC ABO: CPT

## 2018-05-28 PROCEDURE — P9016 RBC LEUKOCYTES REDUCED: HCPCS

## 2018-05-28 PROCEDURE — 86901 BLOOD TYPING SEROLOGIC RH(D): CPT

## 2018-05-28 RX ADMIN — MORPHINE SULFATE PRN MG: 2 INJECTION, SOLUTION INTRAMUSCULAR; INTRAVENOUS at 12:05

## 2018-05-28 RX ADMIN — HYDROCODONE BITARTRATE AND ACETAMINOPHEN PRN TAB: 5; 325 TABLET ORAL at 18:12

## 2018-05-28 RX ADMIN — MORPHINE SULFATE PRN MG: 2 INJECTION, SOLUTION INTRAMUSCULAR; INTRAVENOUS at 23:09

## 2018-05-28 RX ADMIN — Medication SCH ML: at 20:40

## 2018-05-28 RX ADMIN — HYDROCODONE BITARTRATE AND ACETAMINOPHEN PRN TAB: 5; 325 TABLET ORAL at 14:32

## 2018-05-28 RX ADMIN — STANDARDIZED SENNA CONCENTRATE AND DOCUSATE SODIUM SCH TAB: 8.6; 5 TABLET, FILM COATED ORAL at 20:39

## 2018-05-28 RX ADMIN — TAMSULOSIN HYDROCHLORIDE SCH MG: 0.4 CAPSULE ORAL at 20:39

## 2018-05-28 NOTE — PD
HPI


Chief Complaint:  Fall


Time Seen by Provider:  08:18


Travel History


International Travel<30 days:  No


Contact w/Intl Traveler<30days:  No


Traveled to known affect area:  No





History of Present Illness


HPI


Patient is a 88 year old male who comes in complaining of right hip pain after 

a fall.  He says his cane slipped yesterday and he fell between his house and 

the garage.  He says that he fell on his right side.  He denies hitting his 

head or any LOC.  He says his children helped him up into a chair and he is 

basically been sitting there since then.  He took 2 Tylenol for his pain 

without relief of his symptoms.  He called 911 today because the pain has not 

improved and he is unable to walk.  Severity is moderate.





PFSH


Past Medical History


Cancer:  Yes (skin)


Cardiovascular Problems:  No


Diabetes:  No


Diminished Hearing:  Yes ([place])


Endocrine:  No


Gastrointestinal Disorders:  Yes (gerd)


GERD:  Yes


Genitourinary:  Yes (BPH)


Hiatal Hernia:  No


Immune Disorder:  No


Musculoskeletal:  No


Neurologic:  No


Psychiatric:  No


Reproductive:  No


Respiratory:  No


Thyroid Disease:  No


Tetanus Vaccination:  Unknown


Influenza Vaccination:  Yes


Pregnant?:  Not Pregnant





Past Surgical History


Abdominal Surgery:  Yes (lap resection, appy)


AICD:  No


Appendectomy:  Yes


Cardiac Surgery:  No


Ear Surgery:  No


Endocrine Surgery:  Yes (tonsillectomy)


Eye Surgery:  No


Genitourinary Surgery:  No


Gynecologic Surgery:  No


Joint Replacement:  Yes (LEFT KNEE)


Oral Surgery:  No


Pacemaker:  No


Thoracic Surgery:  No


Tonsillectomy:  Yes


Other Surgery:  Yes





Social History


Alcohol Use:  Yes (ONE DAILY )


Tobacco Use:  No


Substance Use:  No





Allergies-Medications


(Allergen,Severity, Reaction):  


Coded Allergies:  


     No Known Allergies (Unverified , 4/20/18)


Reported Meds & Prescriptions





Reported Meds & Active Scripts


Active


Hydrocodone-Acetamin 7.5-325 (Hydrocodone/Acetaminophen) 7.5 Mg-325 Mg Tablet 1 

Tab PO Q4H PRN


Diazepam 5 Mg Tab 5 Mg PO TID PRN


Walker with Front Wheels (Device) 1 Mis Mis Ea .XX AS DIRECTED


Adjustable Commode 3-in-1 (Device) 1 Mis Mis Ea .XX AS DIRECTED


CPM-Continuous Passive Motion Machine 1 Ea Device Ea .XX AS DIRECTED


Reported


Tamsulosin (Tamsulosin HCl) 0.4 Mg Cap 0.4 Mg  HS


Omeprazole 20 Mg Tab 20 Mg PO DAILY


Vitamin B12 (Cyanocobalamin) 500 Mcg Tab 1,000 Mcg PO DAILY


Folic Acid 0.8 Mg Tab 2,000 Mcg PO DAILY








Review of Systems


Except as stated in HPI:  all other systems reviewed are Neg


General / Constitutional:  No: Fever, Chills


HENT:  No: Headaches, Lightheadedness


Cardiovascular:  No: Chest Pain or Discomfort


Respiratory:  No: Shortness of Breath


Gastrointestinal:  No: Nausea, Vomiting


Musculoskeletal:  Positive: Myalgias, Limited ROM, Pain


Skin:  No Rash, No Change in Pigmentation


Neurologic:  No: Weakness, Dizziness





Physical Exam


Narrative


GENERAL: Awake and alert, no acute distress.


SKIN: Focused skin assessment warm/dry.  No wounds or signs of infection.


HEAD: Atraumatic. Normocephalic. 


EYES: Pupils equal and round. No scleral icterus. 


ENT:  Mucous membranes pink and moist.


NECK: Trachea midline. No JVD. 


CARDIOVASCULAR: Regular rate and rhythm.  No murmur appreciated.


RESPIRATORY: No accessory muscle use. Clear to auscultation. Breath sounds 

equal bilaterally. 


GASTROINTESTINAL: Abdomen soft, non-tender, nondistended. 


MUSCULOSKELETAL: No obvious deformities. No clubbing.  No cyanosis.  No edema.  

No tenderness to palpation of the right hip.  Pain with movement of the right 

hip.  Pedal pulses intact.


NEUROLOGICAL: Awake and alert. No obvious cranial nerve deficits.  Motor 

grossly within normal limits. Normal speech.


PSYCHIATRIC: Appropriate mood and affect; insight and judgment normal.





Data


Data


Last Documented VS





Vital Signs








  Date Time  Temp Pulse Resp B/P (MAP) Pulse Ox O2 Delivery O2 Flow Rate FiO2


 


5/28/18 08:19  87 18 148/69 (95) 98 Room Air  








Orders





 Orders


Knee, Complete (4vws) (5/28/18 )


Hip, Uni(Ap&Lat) W Ap Pelvis (5/28/18 )


Iv Access Insert/Monitor (5/28/18 09:27)


Complete Blood Count With Diff (5/28/18 09:27)


Comprehensive Metabolic Panel (5/28/18 09:27)


Act Partial Throm Time (Ptt) (5/28/18 09:27)


Prothrombin Time / Inr (Pt) (5/28/18 09:27)


Type And Screen (5/28/18 09:27)








MDM


Medical Decision Making


Medical Screen Exam Complete:  Yes


Emergency Medical Condition:  Yes


Medical Record Reviewed:  Yes


Differential Diagnosis


Hip fracture versus sprain versus knee fracture


Narrative Course


Patient is an 88-year-old male who comes in complaining of right hip pain after 

fall.  Exam shows pain with movement of the right hip.  X-rays obtained show an 

anterior trochanteric fracture of the right hip.  Patient received 6 mg of 

morphine by EMS prior to arrival and says his pain is controlled for the moment.





Orthopedics consulted.





Patient will be admitted for further management.


Last 24 hours Impressions








Knee X-Ray 5/28/18 0000 Signed





Impressions: 





 CONCLUSION: 





 1.  Mild osteoarthritis without fracture.





 2.  Osteopenia.





  





 





  





  





 





  





 





  





 


 


Hip and Pelvis X-Ray 5/28/18 0000 Signed





Impressions: 





 CONCLUSION: 





 Displaced intertrochanteric fracture right hip





  





 





  





  





 





  





 





  





 











Diagnosis





 Primary Impression:  


 Hip fracture


 Qualified Codes:  S72.001A - Fracture of unspecified part of neck of right 

femur, initial encounter for closed fracture





Admitting Information


Admitting Physician Requests:  Admit











Shamika Jiang MD May 28, 2018 09:49

## 2018-05-28 NOTE — HHI.HP
HPI


Service


[facility]


Primary Care Physician


Colton Hartman MD


Admission Diagnosis


Mechanical fall with right hip fracture


Chief Complaint:  


Right hip pain


Travel History


International Travel<30 Days:  No


Contact w/Intl Traveler <30 Da:  No


Traveled to Known Affected Are:  No


History of Present Illness


Mr. Ramos is a pleasant 86 y/o male with osteoarthritis, BPH, GERD and MGUS (

follows with Dr. Montoya). Pt was admitted to [facility] on 18 for left total 

knee arthroplasty with Dr. Rabago. Patient was recently admitted to [facility] 18 

- 18 for symptomatic anemia.  EGD/Colonoscopy ....gastritis/barretts/

large HH. rectal polyp and int/ext hemorrhoids.  During that admission patient 

was seen by hem/onc who feels anemia is related to surgery and GI losses from 

Eliquis.  


Patient presents to the ER today with complaining of right hip pain after a 

fall.  He says his cane slipped and he fell landing on his right side.  He 

denies hitting his head or any LOC.  He says his children helped him up into a 

chair.  Patient continued to have severe sharp right hip pain.  He took 2 

Tylenol for his pain without relief of his symptoms.  He called 911 today 

because the pain has not improved and he is unable to walk.  


Patient denies shortness of breath chest pain nausea vomiting diarrhea 

constipation fevers chills cough congestion.





Review of Systems


Respiratory:  DENIES: Cough, Sputum production, Shortness of breath


Cardiovascular:  DENIES: Chest pain, Dyspnea on Exertion, Lower Extremity Edema


Gastrointestinal:  DENIES: Constipation, Diarrhea, Nausea, Vomiting


Musculoskeletal:  COMPLAINS OF: Joint pain, Stiffness, Joint Swelling


Neurologic:  COMPLAINS OF: Abnormal gait, DENIES: Headache, Speech Problems


Psychiatric:  DENIES: Anxiety, Confusion, Depression





Past Family Social History


Past Medical History


Osteoarthritis


BPH


GERD


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


MGUS


Thrombocytosis


Memory loss


Nonthrombocytopenic purpura





Past Surgical History


18 for left total knee arthroplasty with Dr. Rabago


Appendectomy


Partial colectomy


Vasectomy


Tonsillectomy


Reported Medications


Hydrocodone-Acetamin 7.5-325 (Hydrocodone/Acetaminophen) 7.5 Mg-325 Mg Tablet 1 

Tab PO Q4H PRN


Diazepam 5 Mg Tab 5 Mg PO TID PRN


Tamsulosin (Tamsulosin HCl) 0.4 Mg Cap 0.4 Mg  HS


Omeprazole 20 Mg Tab 20 Mg PO DAILY


Vitamin B12 (Cyanocobalamin) 500 Mcg Tab 1,000 Mcg PO DAILY


Folic Acid 0.8 Mg Tab 2,000 Mcg PO DAILY


Allergies:  


Coded Allergies:  


     No Known Allergies (Unverified , 18)


Family History


Noncontributory


Social History


No reported alcohol, tobacco or illicit drug use





Physical Exam


Vital Signs





Vital Signs








  Date Time  Temp Pulse Resp B/P (MAP) Pulse Ox O2 Delivery O2 Flow Rate FiO2


 


18 08:19  87 18 148/69 (95) 98 Room Air  


 


18 08:19     97 Room Air  








Physical Exam


GENERAL: This is a 88 year old male patient well-nourished, well-developed 

patient, in no apparent distress.


SKIN: No rashes, ecchymoses or lesions. Cool and dry.


HEAD: Atraumatic. Normocephalic. No temporal or scalp tenderness.


EYES: Extraocular motions intact. No scleral icterus. No injection or drainage. 


CARDIOVASCULAR: Regular rate and rhythm 


RESPIRATORY: Clear to auscultation. Breath sounds equal bilaterally.


GASTROINTESTINAL: Abdomen soft, non-tender, nondistended. 


MUSCULOSKELETAL: Extremities without clubbing, cyanosis, or edema. No joint 

tenderness, effusion, or edema noted. No calf tenderness. Negative Homans sign 

bilaterally.  right lower extremity shorted and rotated


NEUROLOGICAL: Awake and alert. No focal deficits noted.  Motor and sensory 

grossly within normal limits. Five out of 5 muscle strength in all muscle groups

, with the exception of RLE ROM limited due to pain.  Normal speech.


Laboratory





Laboratory Tests








Test


  18


09:40


 


White Blood Count 7.1 


 


Red Blood Count 2.46 


 


Hemoglobin 8.9 


 


Hematocrit 25.4 


 


Mean Corpuscular Volume 103.5 


 


Mean Corpuscular Hemoglobin 36.1 


 


Mean Corpuscular Hemoglobin


Concent 34.9 


 


 


Red Cell Distribution Width 18.2 


 


Platelet Count 283 


 


Mean Platelet Volume 7.9 


 


Neutrophils (%) (Auto) 82.7 


 


Lymphocytes (%) (Auto) 7.7 


 


Monocytes (%) (Auto) 9.0 


 


Eosinophils (%) (Auto) 0.1 


 


Basophils (%) (Auto) 0.5 


 


Neutrophils # (Auto) 5.9 


 


Lymphocytes # (Auto) 0.5 


 


Monocytes # (Auto) 0.6 


 


Eosinophils # (Auto) 0.0 


 


Basophils # (Auto) 0.0 


 


CBC Comment DIFF FINAL 


 


Differential Comment  


 


Hematology Comments  


 


Prothrombin Time 11.6 


 


Prothromb Time International


Ratio 1.1 


 


 


Activated Partial


Thromboplast Time 22.9 


 


 


Blood Urea Nitrogen 17 


 


Creatinine 1.27 


 


Random Glucose 105 


 


Total Protein 6.6 


 


Albumin 3.1 


 


Calcium Level 8.1 


 


Alkaline Phosphatase 142 


 


Aspartate Amino Transf


(AST/SGOT) 17 


 


 


Alanine Aminotransferase


(ALT/SGPT) 16 


 


 


Total Bilirubin 1.9 


 


Sodium Level 141 


 


Potassium Level 4.3 


 


Chloride Level 107 


 


Carbon Dioxide Level 27.4 


 


Anion Gap 7 


 


Estimat Glomerular Filtration


Rate 54 


 








Result Diagram:  


18 0940                                                                   

             18 0940





Imaging





Last Impressions








Knee X-Ray 18 0000 Signed





Impressions: 





 CONCLUSION: 





 1.  Mild osteoarthritis without fracture.





 2.  Osteopenia.





  





 





  





  





 





  





 





  





 


 


Hip and Pelvis X-Ray 18 0000 Signed





Impressions: 





 CONCLUSION: 





 Displaced intertrochanteric fracture right hip





  





 





  





  





 





  





 





  





 











Caprini VTE Risk Assessment


Caprini VTE Risk Assessment:  Mod/High Risk (score >= 2)


Caprini Risk Assessment Model











 Point Value = 1          Point Value = 2  Point Value = 3  Point Value = 5


 


Age 41-60


Minor surgery


BMI > 25 kg/m2


Swollen legs


Varicose veins


Pregnancy or postpartum


History of unexplained or recurrent


   spontaneous 


Oral contraceptives or hormone


   replacement


Sepsis (< 1 month)


Serious lung disease, including


   pneumonia (< 1 month)


Abnormal pulmonary function


Acute myocardial infarction


Congestive heart failure (< 1 month)


History of inflammatory bowel disease


Medical patient at bed rest Age 61-74


Arthroscopic surgery


Major open surgery (> 45 min)


Laparoscopic surgery (> 45 min)


Malignancy


Confined to bed (> 72 hours)


Immobilizing plaster cast


Central venous access Age >= 75


History of VTE


Family history of VTE


Factor V Leiden


Prothrombin 54528U


Lupus anticoagulant


Anticardiolipin antibodies


Elevated serum homocysteine


Heparin-induced thrombocytopenia


Other congenital or acquired


   thrombophilia Stroke (< 1 month)


Elective arthroplasty


Hip, pelvis, or leg fracture


Acute spinal cord injury (< 1 month)








Prophylaxis Regimen











   Total Risk


Factor Score Risk Level Prophylaxis Regimen


 


0-1      Low Early ambulation


 


2 Moderate Order ONE of the following:


*Sequential Compression Device (SCD)


*Heparin 5000 units SQ BID


 


3-4 Higher Order ONE of the following medications:


*Heparin 5000 units SQ TID


*Enoxaparin/Lovenox 40 mg SQ daily (WT < 150 kg, CrCl > 30 mL/min)


*Enoxaparin/Lovenox 30 mg SQ daily (WT < 150 kg, CrCl > 10-29 mL/min)


*Enoxaparin/Lovenox 30 mg SQ BID (WT < 150 kg, CrCl > 30 mL/min)


AND/OR


*Sequential Compression Device (SCD)


 


5 or more Highest Order ONE of the following medications:


*Heparin 5000 units SQ TID (Preferred with Epidurals)


*Enoxaparin/Lovenox 40 mg SQ daily (WT < 150 kg, CrCl > 30 mL/min)


*Enoxaparin/Lovenox 30 mg SQ daily (WT < 150 kg, CrCl > 10-29 mL/min)


*Enoxaparin/Lovenox 30 mg SQ BID (WT < 150 kg, CrCl > 30 mL/min)


AND


*Sequential Compression Device (SCD)











Assessment and Plan


Problem List:  


(1) Hip fracture


ICD Codes:  S72.009A - Fracture of unspecified part of neck of unspecified femur

, initial encounter for closed fracture


Status:  Acute


Plan:  Right hip fracture


Patient had a mechanical fall today unable to stand with severe right hip pain 

and


Hip/pelvis x-ray reviewed and reveals a displaced intertrochanteric fracture of 

the right hip


Patient has had recent knee surgery with Dr. Rabago requesting orthopedic 

surgery Dr. Rabago


Consultation placed


Acetaminophen, Norco and Morphine as needed for pain


NPO after midnight


IVF for hydration





SCDs for DVT prophylaxis orthopedic surgery to determine further DVT prophylaxis





MGUS


Chronic follows with Dr. Montoya





GERD


Continue patient's home omeprazole





BPH


Continue home Flomax





(2) MGUS (monoclonal gammopathy of unknown significance)


ICD Codes:  D47.2 - Monoclonal gammopathy


Status:  Chronic


(3) GERD (gastroesophageal reflux disease)


ICD Codes:  K21.9 - Gastro-esophageal reflux disease without esophagitis


Status:  Chronic


(4) BPH (benign prostatic hyperplasia)


ICD Codes:  N40.0 - Benign prostatic hyperplasia without lower urinary tract 

symptoms


Status:  Chronic


(5) Status post total left knee replacement


ICD Codes:  Z96.652 - Presence of left artificial knee joint


Assessment and Plan


Patient examined.


Assessment and plan formulated with Alessandra Valdovinos PA-C.


I agree with the above.


Pt with recent knee surgery with dr Rabago. 


Pt had a fall and now has hip fx.


Medically stable to proceed with ORIF tomorrow with Dr Rabago.


Anemia noted since his last admission. No active bleeding.





Physician Certification


2 Midnight Certification Type:  Admission for Inpatient Services


Order for Inpatient Services


The services are ordered in accordance with Medicare regulations or non-

Medicare payer requirements, as applicable.  In the case of services not 

specified as inpatient-only, they are appropriately provided as inpatient 

services in accordance with the 2-midnight benchmark.


Estimated LOS (days):  3


 days is the estimated time the patient will need to remain in the hospital, 

assuming treatment plan goals are met and no additional complications.


Post-Hospital Plan:  SNF





Problem Qualifiers





(1) Hip fracture:  


Qualified Codes:  S72.001A - Fracture of unspecified part of neck of right femur

, initial encounter for closed fracture








Alessandra Valdovinos May 28, 2018 10:54


Isai Delgado MD May 28, 2018 21:38

## 2018-05-28 NOTE — RADRPT
EXAM DATE:  5/28/2018 9:14 AM EDT

AGE/SEX:        88 years / Male



INDICATIONS:  Right hip pain and deformity after fall 1 day ago



CLINICAL DATA:  This is the patient's initial encounter. Patient reports that signs and symptoms have
 been present for 1 day and indicates a pain score of 9/10. 

                                                                          

MEDICAL/SURGICAL HISTORY:       Gastroesophageal reflux disease. Skin cancer  Appendectomy. Left knee
 surgery



COMPARISON:      No prior [facility] exams available for comparison.



FINDINGS:  

Views of the right knee are obtained. No fracture or effusion. Mild osteoarthritis.  Joints are intac
t without dislocation or significant arthropathy.  Osseous density is under mineralized.  Soft tissue
s are unremarkable.  No radiopaque foreign bodies seen.  



CONCLUSION: 

1.  Mild osteoarthritis without fracture.

2.  Osteopenia.



 







Electronically signed by: Rick Soto MD  5/28/2018 9:17 AM EDT

## 2018-05-28 NOTE — RADRPT
EXAM DATE:  5/28/2018 9:11 AM EDT

AGE/SEX:        88 years / Male



INDICATIONS:  Right hip pain and deformity after fall



CLINICAL DATA:  This is the patient's initial encounter. Patient reports that signs and symptoms have
 been present for 1 day and indicates a pain score of 9/10. 

                                                                          

MEDICAL/SURGICAL HISTORY:       Gastroesophageal reflux disease. skin cancer  Appendectomy. Left knee
 surgery



COMPARISON:      No prior [facility] exams available for comparison.



FINDINGS:  

Views of the right hip are obtained. Displaced intertrochanteric fracture. Femoral head continues to 
articulate with the acetabulum. There is minimal comminution.  



CONCLUSION: 

Displaced intertrochanteric fracture right hip



 







Electronically signed by: Rick Soto MD  5/28/2018 9:13 AM EDT

## 2018-05-29 VITALS
SYSTOLIC BLOOD PRESSURE: 136 MMHG | HEART RATE: 89 BPM | OXYGEN SATURATION: 96 % | TEMPERATURE: 98.1 F | RESPIRATION RATE: 20 BRPM | DIASTOLIC BLOOD PRESSURE: 58 MMHG

## 2018-05-29 VITALS
SYSTOLIC BLOOD PRESSURE: 125 MMHG | RESPIRATION RATE: 18 BRPM | OXYGEN SATURATION: 94 % | DIASTOLIC BLOOD PRESSURE: 59 MMHG | HEART RATE: 104 BPM | TEMPERATURE: 98.6 F

## 2018-05-29 VITALS
HEART RATE: 79 BPM | TEMPERATURE: 97.8 F | DIASTOLIC BLOOD PRESSURE: 68 MMHG | RESPIRATION RATE: 18 BRPM | OXYGEN SATURATION: 98 % | SYSTOLIC BLOOD PRESSURE: 119 MMHG

## 2018-05-29 VITALS
DIASTOLIC BLOOD PRESSURE: 59 MMHG | RESPIRATION RATE: 18 BRPM | TEMPERATURE: 98.3 F | OXYGEN SATURATION: 98 % | SYSTOLIC BLOOD PRESSURE: 130 MMHG | HEART RATE: 86 BPM

## 2018-05-29 VITALS — OXYGEN SATURATION: 95 %

## 2018-05-29 LAB
BASO STIPL BLD QL SMEAR: (no result)
BASOPHILS # BLD AUTO: 0.1 TH/MM3 (ref 0–0.2)
BASOPHILS NFR BLD: 1 % (ref 0–2)
BUN SERPL-MCNC: 20 MG/DL (ref 7–18)
CALCIUM SERPL-MCNC: 8.2 MG/DL (ref 8.5–10.1)
CHLORIDE SERPL-SCNC: 105 MEQ/L (ref 98–107)
CREAT SERPL-MCNC: 1.08 MG/DL (ref 0.6–1.3)
EOSINOPHIL # BLD: 0.1 TH/MM3 (ref 0–0.4)
EOSINOPHIL NFR BLD: 0.8 % (ref 0–4)
ERYTHROCYTE [DISTWIDTH] IN BLOOD BY AUTOMATED COUNT: 18.5 % (ref 11.6–17.2)
GFR SERPLBLD BASED ON 1.73 SQ M-ARVRAT: 65 ML/MIN (ref 89–?)
GLUCOSE SERPL-MCNC: 108 MG/DL (ref 74–106)
HCO3 BLD-SCNC: 26.9 MEQ/L (ref 21–32)
HCT VFR BLD CALC: 24.4 % (ref 39–51)
HGB BLD-MCNC: 8.9 GM/DL (ref 13–17)
LYMPHOCYTES # BLD AUTO: 1.2 TH/MM3 (ref 1–4.8)
LYMPHOCYTES NFR BLD AUTO: 13.9 % (ref 9–44)
LYMPHOCYTES: 16 % (ref 9–44)
MCH RBC QN AUTO: 38.6 PG (ref 27–34)
MCHC RBC AUTO-ENTMCNC: 36.4 % (ref 32–36)
MCV RBC AUTO: 106.1 FL (ref 80–100)
MONOCYTE #: 0.6 TH/MM3 (ref 0–0.9)
MONOCYTES NFR BLD: 6.7 % (ref 0–8)
MONOCYTES: 8 % (ref 0–8)
MYELOCYTES NFR BLD: 1 % (ref 0–0)
NEUTROPHILS # BLD AUTO: 6.5 TH/MM3 (ref 1.8–7.7)
NEUTROPHILS NFR BLD AUTO: 77.6 % (ref 16–70)
NEUTS BAND # BLD MANUAL: 6.4 TH/MM3 (ref 1.8–7.7)
NEUTS BAND NFR BLD: 1 % (ref 0–6)
NEUTS SEG NFR BLD MANUAL: 74 % (ref 16–70)
PLATELET # BLD: 284 TH/MM3 (ref 150–450)
PMV BLD AUTO: 8.4 FL (ref 7–11)
RBC # BLD AUTO: 2.3 MIL/MM3 (ref 4.5–5.9)
SODIUM SERPL-SCNC: 138 MEQ/L (ref 136–145)
WBC # BLD AUTO: 8.4 TH/MM3 (ref 4–11)

## 2018-05-29 PROCEDURE — 0QS604Z REPOSITION RIGHT UPPER FEMUR WITH INTERNAL FIXATION DEVICE, OPEN APPROACH: ICD-10-PCS | Performed by: SURGERY

## 2018-05-29 RX ADMIN — OXYTOCIN SCH MLS/HR: 10 INJECTION, SOLUTION INTRAMUSCULAR; INTRAVENOUS at 12:30

## 2018-05-29 RX ADMIN — OXYTOCIN PRN MLS/HR: 10 INJECTION, SOLUTION INTRAMUSCULAR; INTRAVENOUS at 09:04

## 2018-05-29 RX ADMIN — OXYTOCIN SCH MLS/HR: 10 INJECTION, SOLUTION INTRAMUSCULAR; INTRAVENOUS at 19:53

## 2018-05-29 RX ADMIN — Medication SCH ML: at 08:33

## 2018-05-29 RX ADMIN — MAGNESIUM HYDROXIDE PRN ML: 400 SUSPENSION ORAL at 19:48

## 2018-05-29 RX ADMIN — DIAZEPAM PRN MG: 5 TABLET ORAL at 15:25

## 2018-05-29 RX ADMIN — Medication SCH ML: at 19:49

## 2018-05-29 RX ADMIN — TAMSULOSIN HYDROCHLORIDE SCH MG: 0.4 CAPSULE ORAL at 19:48

## 2018-05-29 RX ADMIN — PANTOPRAZOLE SODIUM SCH MG: 20 TABLET, DELAYED RELEASE ORAL at 08:18

## 2018-05-29 RX ADMIN — DIAZEPAM PRN MG: 5 TABLET ORAL at 00:03

## 2018-05-29 RX ADMIN — STANDARDIZED SENNA CONCENTRATE AND DOCUSATE SODIUM SCH TAB: 8.6; 5 TABLET, FILM COATED ORAL at 08:17

## 2018-05-29 RX ADMIN — OXYTOCIN PRN MLS/HR: 10 INJECTION, SOLUTION INTRAMUSCULAR; INTRAVENOUS at 08:33

## 2018-05-29 RX ADMIN — HYDROCODONE BITARTRATE AND ACETAMINOPHEN PRN TAB: 5; 325 TABLET ORAL at 08:28

## 2018-05-29 RX ADMIN — HYDROCODONE BITARTRATE AND ACETAMINOPHEN PRN TAB: 7.5; 325 TABLET ORAL at 14:15

## 2018-05-29 NOTE — HHI.PR
Subjective


Remarks


Patient S/P ORIF right hip with Dr. Rabago 


Patient c/o muscle spasms





Objective


Vitals





Vital Signs








  Date Time  Temp Pulse Resp B/P (MAP) Pulse Ox O2 Delivery O2 Flow Rate FiO2


 


5/29/18 13:30 98.3 86 16 120/57 (78) 97 Room Air  


 


5/29/18 13:15  83 16 110/56 (74) 95 Room Air  


 


5/29/18 13:00  80 16 112/53 (72) 95 Room Air  


 


5/29/18 12:45  81 16 116/54 (74) 96 Nasal Cannula 2 


 


5/29/18 12:30  84 16 115/56 (75) 96 Nasal Cannula 2 


 


5/29/18 12:27 98.3 86 16 127/59 (81) 94 Nasal Cannula 2 


 


5/29/18 10:36      Nasal Cannula  


 


5/29/18 08:00 98.3 86 18 130/59 (82) 98   


 


5/29/18 03:48 97.8 79 18 119/68 (85) 98   


 


5/28/18 23:40 97.8 72 18 130/60 (83) 97   


 


5/28/18 19:55 97.7 77 18 108/55 (72) 98   


 


5/28/18 16:19 98.1 79 18 130/60 (83) 99   














 5/29/18 5/29/18 5/30/18





 14:59 22:59 06:59


 


Intake Total 1800 ml  


 


Output Total 50 ml  


 


Balance 1750 ml  


 


   


 


Other 1800 ml  


 


Estimated Blood Loss 50 ml  








Result Diagram:  


5/29/18 0816                                                                   

             5/29/18 0816





Other Results





 Laboratory Tests








Test


  5/28/18


09:40 5/29/18


08:16


 


White Blood Count 7.1 TH/MM3  8.4 TH/MM3 


 


Red Blood Count 2.46 MIL/MM3  2.30 MIL/MM3 


 


Hemoglobin 8.9 GM/DL  8.9 GM/DL 


 


Hematocrit 25.4 %  24.4 % 


 


Mean Corpuscular Volume 103.5 FL  106.1 FL 


 


Mean Corpuscular Hemoglobin 36.1 PG  38.6 PG 


 


Mean Corpuscular Hemoglobin


Concent 34.9 % 


  36.4 % 


 


 


Red Cell Distribution Width 18.2 %  18.5 % 


 


Platelet Count 283 TH/MM3  284 TH/MM3 


 


Mean Platelet Volume 7.9 FL  8.4 FL 


 


Neutrophils (%) (Auto) 82.7 %  77.6 % 


 


Lymphocytes (%) (Auto) 7.7 %  13.9 % 


 


Monocytes (%) (Auto) 9.0 %  6.7 % 


 


Eosinophils (%) (Auto) 0.1 %  0.8 % 


 


Basophils (%) (Auto) 0.5 %  1.0 % 


 


Neutrophils # (Auto) 5.9 TH/MM3  6.5 TH/MM3 


 


Lymphocytes # (Auto) 0.5 TH/MM3  1.2 TH/MM3 


 


Monocytes # (Auto) 0.6 TH/MM3  0.6 TH/MM3 


 


Eosinophils # (Auto) 0.0 TH/MM3  0.1 TH/MM3 


 


Basophils # (Auto) 0.0 TH/MM3  0.1 TH/MM3 


 


CBC Comment DIFF FINAL  AUTO DIFF 


 


Differential Comment


   


  FINAL DIFF


MANUAL


 


Hematology Comments    


 


Prothrombin Time 11.6 SEC  


 


Prothromb Time International


Ratio 1.1 RATIO 


  


 


 


Activated Partial


Thromboplast Time 22.9 SEC 


  


 


 


Blood Urea Nitrogen 17 MG/DL  20 MG/DL 


 


Creatinine 1.27 MG/DL  1.08 MG/DL 


 


Random Glucose 105 MG/DL  108 MG/DL 


 


Total Protein 6.6 GM/DL  


 


Albumin 3.1 GM/DL  


 


Calcium Level 8.1 MG/DL  8.2 MG/DL 


 


Alkaline Phosphatase 142 U/L  


 


Aspartate Amino Transf


(AST/SGOT) 17 U/L 


  


 


 


Alanine Aminotransferase


(ALT/SGPT) 16 U/L 


  


 


 


Total Bilirubin 1.9 MG/DL  


 


Sodium Level 141 MEQ/L  138 MEQ/L 


 


Potassium Level 4.3 MEQ/L  6.2 MEQ/L 


 


Chloride Level 107 MEQ/L  105 MEQ/L 


 


Carbon Dioxide Level 27.4 MEQ/L  26.9 MEQ/L 


 


Anion Gap 7 MEQ/L  6 MEQ/L 


 


Estimat Glomerular Filtration


Rate 54 ML/MIN 


  65 ML/MIN 


 


 


Differential Total Cells


Counted 


  100 


 


 


Neutrophils % (Manual)  74 % 


 


Band Neutrophils %  1 % 


 


Lymphocytes %  16 % 


 


Monocytes %  8 % 


 


Neutrophils # (Manual)  6.4 TH/MM3 


 


Myelocytes  1 % 


 


Platelet Estimate  NORMAL 


 


Platelet Morphology Comment  NORMAL 


 


Basophilic Stippling  MOD 








Imaging





Last Impressions








Knee X-Ray 5/28/18 0000 Signed





Impressions: 





 CONCLUSION: 





 1.  Mild osteoarthritis without fracture.





 2.  Osteopenia.





  





 





  





  





 





  





 





  





 


 


Hip and Pelvis X-Ray 5/28/18 0000 Signed





Impressions: 





 CONCLUSION: 





 Displaced intertrochanteric fracture right hip





  





 





  





  





 





  





 





  





 








Objective Remarks


GENERAL: This is a well-nourished, well-developed patient, in no apparent 

distress.


SKIN: Post op dressing dry and intact


CARDIOVASCULAR: Regular rate and rhythm 


RESPIRATORY: Clear to auscultation. Breath sounds equal bilaterally. 


GASTROINTESTINAL: Abdomen soft, non-tender, nondistended. Normal active bowel 

sounds


MUSCULOSKELETAL: Extremities without clubbing, cyanosis, or edema.


NEURO:  Alert & Oriented .  Moves all ext x4


Procedures


ORIF right hip 5/29/18 with Dr. Rabago





A/P


Problem List:  


(1) Hip fracture


ICD Codes:  S72.009A - Fracture of unspecified part of neck of unspecified femur

, initial encounter for closed fracture


Status:  Acute


Plan:  Right hip fracture


Patient had a mechanical fall today unable to stand with severe right hip pain 

and


Hip/pelvis x-ray reviewed and reveals a displaced intertrochanteric fracture of 

the right hip


Patient has had recent knee surgery with Dr. Rabago requesting orthopedic 

surgery Dr. Rabago


Consultation placed


Acetaminophen, Norco and Morphine as needed for pain


S/P right hip ORIF with Dr. Rabago 5/29


add Flexeril 5 mg PO Q8H as needed for muscle spasms


recheck CBC in AM





SCDs for DVT prophylaxis orthopedic surgery to determine further DVT prophylaxis





MGUS


Chronic follows with Dr. Montoya





GERD


Continue patient's home omeprazole





BPH


Continue home Flomax





(2) MGUS (monoclonal gammopathy of unknown significance)


ICD Codes:  D47.2 - Monoclonal gammopathy


Status:  Chronic


(3) GERD (gastroesophageal reflux disease)


ICD Codes:  K21.9 - Gastro-esophageal reflux disease without esophagitis


Status:  Chronic


(4) BPH (benign prostatic hyperplasia)


ICD Codes:  N40.0 - Benign prostatic hyperplasia without lower urinary tract 

symptoms


Status:  Chronic


(5) Status post total left knee replacement


ICD Codes:  Z96.652 - Presence of left artificial knee joint





Problem Qualifiers





(1) Hip fracture:  


Qualified Codes:  S72.001A - Fracture of unspecified part of neck of right femur

, initial encounter for closed fracture








Alessandra Valdovinos May 29, 2018 15:16

## 2018-05-29 NOTE — RADRPT
EXAM DATE:  5/29/2018 3:09 PM EDT

AGE/SEX:        88 years / Male



INDICATIONS:  Right troch nail.



CLINICAL DATA:  This is the patient's initial encounter. Patient reports that signs and symptoms have
 been present for 1 day and indicates a pain score of Nonresponsive. 

                                                                          

MEDICAL/SURGICAL HISTORY:       Non-responsive. Non-responsive.



COMPARISON:      No prior [facility] exams available for comparison.



FINDINGS:  

Interval intramedullary jaylon fixation of right femoral intertrochanteric fracture. Hardware appears we
ll positioned and intact. There is near-anatomic alignment of the fracture fragments.



CONCLUSION: 

1.  Status post right hip ORIF, as above.



 







Electronically signed by: Marvel Vargas MD  5/29/2018 4:27 PM EDT

## 2018-05-29 NOTE — MB
cc:

TYLER Rabago MD

****

 

 

DATE:

05/29/2018

 

REASON FOR CONSULTATION:

Fracture right hip.

 

HISTORY OF PRESENT ILLNESS:

This pleasant 88-year-old male is seen today following a trip and fall

at home in which he sustained injury to his right hip, which was 

brought to [place] and admitted for definitive stabilization.

 

OTHER PAST HISTORY:

On 04/09/2018, he underwent a left total knee and did well.

 

PAST MEDICAL HISTORY:

He has a history of chronic anemia.

 

PAST SURGICAL HISTORY:

Appendectomy, partial colectomy, vasectomy, tonsillectomy.

 

MEDICATIONS AT HOME:

Hydrocodone, diazepam, tamsulosin, omeprazole, vitamin B12, folic 

acid.

 

REVIEW OF SYSTEMS:

Noncontributory.

 

FAMILY HISTORY:

Noncontributory.

 

SOCIAL HISTORY:

Does not smoke or drink.

 

ALLERGIES:

HAS NO KNOWN ALLERGIES.

 

PHYSICAL EXAMINATION:

GENERAL:  A 88-year-old male, well-developed, well-nourished, oriented

x 3, complaining of pain in the right hip.

VITAL SIGNS:  Stable, afebrile.

EXTREMITIES:  Right lower extremity is short and externally rotated.  

Neurovascularly intact to his toes.

 

IMPRESSION AT THIS TIME:

Displaced intertrochanteric fracture, right hip.

 

PLAN:

Admission by medical service followed by open reduction and internal 

fixation of right hip fracture today.

 

 

__________________________________

J. Isai Rabago MD

 

 

JRR/TL

D: 05/29/2018, 10:10 AM

T: 05/29/2018, 10:22 AM

Visit #: [id number]

Job #: [id number]

## 2018-05-29 NOTE — HHI.PR
__________________________________________________





Immediate Post Op Note


Procedure Date:


May 29, 2018


Pre Op Diagnosis:  


Displaced intertrochanteric fracture, right hip


Post Op Diagnosis:  


Displaced intertrochanteric fracture, right hip


Surgeon:


ALFREDO Rabago MD


Assistant(s):


Lisa NUGENT


Procedure:


open reduction and internal fixation of right hip fracture


Complications:


none


Estimated blood loss:


50 cc


Anesthesia:  Spinal


Drains:  None


IVF


Urinary Output (mLs):  0 (no fole)


Tourniquet time (min at mmHg)


none


Patient to:  PACU


Patient Condition:  Good


Implant/Devices:  SEE IMPLANT LOG (if applicable)


Date/Time of Procedure:  SEE SURGICAL CARE RECORD











Lisa Aldridge May 29, 2018 12:29

## 2018-05-30 VITALS
RESPIRATION RATE: 16 BRPM | SYSTOLIC BLOOD PRESSURE: 109 MMHG | TEMPERATURE: 98.3 F | OXYGEN SATURATION: 93 % | DIASTOLIC BLOOD PRESSURE: 54 MMHG | HEART RATE: 106 BPM

## 2018-05-30 VITALS
RESPIRATION RATE: 16 BRPM | HEART RATE: 120 BPM | DIASTOLIC BLOOD PRESSURE: 69 MMHG | SYSTOLIC BLOOD PRESSURE: 103 MMHG | TEMPERATURE: 98.3 F | OXYGEN SATURATION: 94 %

## 2018-05-30 VITALS
HEART RATE: 103 BPM | OXYGEN SATURATION: 93 % | RESPIRATION RATE: 18 BRPM | SYSTOLIC BLOOD PRESSURE: 102 MMHG | TEMPERATURE: 98.3 F | DIASTOLIC BLOOD PRESSURE: 58 MMHG

## 2018-05-30 VITALS
DIASTOLIC BLOOD PRESSURE: 70 MMHG | RESPIRATION RATE: 18 BRPM | TEMPERATURE: 99 F | OXYGEN SATURATION: 96 % | SYSTOLIC BLOOD PRESSURE: 129 MMHG | HEART RATE: 120 BPM

## 2018-05-30 VITALS
OXYGEN SATURATION: 96 % | TEMPERATURE: 98.6 F | RESPIRATION RATE: 18 BRPM | SYSTOLIC BLOOD PRESSURE: 127 MMHG | DIASTOLIC BLOOD PRESSURE: 59 MMHG | HEART RATE: 112 BPM

## 2018-05-30 VITALS
OXYGEN SATURATION: 93 % | HEART RATE: 123 BPM | SYSTOLIC BLOOD PRESSURE: 151 MMHG | RESPIRATION RATE: 18 BRPM | TEMPERATURE: 99.2 F | DIASTOLIC BLOOD PRESSURE: 69 MMHG

## 2018-05-30 VITALS — OXYGEN SATURATION: 94 %

## 2018-05-30 VITALS — OXYGEN SATURATION: 92 %

## 2018-05-30 LAB
BASOPHILS # BLD AUTO: 0.1 TH/MM3 (ref 0–0.2)
BASOPHILS NFR BLD: 0.8 % (ref 0–2)
EOSINOPHIL # BLD: 0 TH/MM3 (ref 0–0.4)
EOSINOPHIL NFR BLD: 0 % (ref 0–4)
ERYTHROCYTE [DISTWIDTH] IN BLOOD BY AUTOMATED COUNT: 20.4 % (ref 11.6–17.2)
HCT VFR BLD CALC: 21.1 % (ref 39–51)
HCT VFR BLD CALC: 22.7 % (ref 39–51)
HGB BLD-MCNC: 7.7 GM/DL (ref 13–17)
HGB BLD-MCNC: 7.8 GM/DL (ref 13–17)
LYMPHOCYTES # BLD AUTO: 0.9 TH/MM3 (ref 1–4.8)
LYMPHOCYTES NFR BLD AUTO: 8 % (ref 9–44)
LYMPHOCYTES: 11 % (ref 9–44)
MCH RBC QN AUTO: 38.2 PG (ref 27–34)
MCHC RBC AUTO-ENTMCNC: 36.6 % (ref 32–36)
MCV RBC AUTO: 104.3 FL (ref 80–100)
METAMYELOCYTES NFR BLD: 3 % (ref 0–1)
MONOCYTE #: 0.5 TH/MM3 (ref 0–0.9)
MONOCYTES NFR BLD: 4.1 % (ref 0–8)
MONOCYTES: 4 % (ref 0–8)
NEUTROPHILS # BLD AUTO: 9.5 TH/MM3 (ref 1.8–7.7)
NEUTROPHILS NFR BLD AUTO: 87.1 % (ref 16–70)
NEUTS BAND # BLD MANUAL: 9.3 TH/MM3 (ref 1.8–7.7)
NEUTS BAND NFR BLD: 18 % (ref 0–6)
NEUTS SEG NFR BLD MANUAL: 64 % (ref 16–70)
PLATELET # BLD: 348 TH/MM3 (ref 150–450)
PMV BLD AUTO: 9.2 FL (ref 7–11)
RBC # BLD AUTO: 2.02 MIL/MM3 (ref 4.5–5.9)
TOXIC GRANULES BLD QL SMEAR: (no result)
WBC # BLD AUTO: 10.9 TH/MM3 (ref 4–11)

## 2018-05-30 RX ADMIN — PANTOPRAZOLE SODIUM SCH MG: 20 TABLET, DELAYED RELEASE ORAL at 08:14

## 2018-05-30 RX ADMIN — HYDROCODONE BITARTRATE AND ACETAMINOPHEN PRN TAB: 7.5; 325 TABLET ORAL at 08:15

## 2018-05-30 RX ADMIN — MULTIPLE VITAMINS W/ MINERALS TAB SCH TAB: TAB at 20:19

## 2018-05-30 RX ADMIN — HYDROCODONE BITARTRATE AND ACETAMINOPHEN PRN TAB: 7.5; 325 TABLET ORAL at 18:05

## 2018-05-30 RX ADMIN — Medication SCH ML: at 20:20

## 2018-05-30 RX ADMIN — DOCUSATE SODIUM SCH MG: 100 CAPSULE, LIQUID FILLED ORAL at 20:19

## 2018-05-30 RX ADMIN — APIXABAN SCH MG: 2.5 TABLET, FILM COATED ORAL at 08:14

## 2018-05-30 RX ADMIN — OXYTOCIN SCH MLS/HR: 10 INJECTION, SOLUTION INTRAMUSCULAR; INTRAVENOUS at 10:48

## 2018-05-30 RX ADMIN — APIXABAN SCH MG: 2.5 TABLET, FILM COATED ORAL at 20:19

## 2018-05-30 RX ADMIN — Medication SCH ML: at 08:09

## 2018-05-30 RX ADMIN — TAMSULOSIN HYDROCHLORIDE SCH MG: 0.4 CAPSULE ORAL at 20:19

## 2018-05-30 RX ADMIN — MAGNESIUM HYDROXIDE PRN ML: 400 SUSPENSION ORAL at 20:19

## 2018-05-30 NOTE — PD.ORT.PN
Subjective


Subjective Remarks


Pt fairly comfortable at present.





Objective


Vitals





Vital Signs








  Date Time  Temp Pulse Resp B/P (MAP) Pulse Ox O2 Delivery O2 Flow Rate FiO2


 


5/30/18 03:00 99.0 120 18 129/70 (89) 96   


 


5/30/18 00:22     93 Nasal Cannula 2.00 


 


5/30/18 00:03 99.2 123 18 151/69 (96) 93   


 


5/29/18 20:33     95   


 


5/29/18 19:47 98.6 104 18 125/59 (81) 94   


 


5/29/18 14:28 98.1 89 20 136/58 (84) 96   


 


5/29/18 13:30 98.3 86 16 120/57 (78) 97 Room Air  


 


5/29/18 13:15  83 16 110/56 (74) 95 Room Air  


 


5/29/18 13:00  80 16 112/53 (72) 95 Room Air  


 


5/29/18 12:45  81 16 116/54 (74) 96 Nasal Cannula 2 


 


5/29/18 12:30  84 16 115/56 (75) 96 Nasal Cannula 2 


 


5/29/18 12:27 98.3 86 16 127/59 (81) 94 Nasal Cannula 2 


 


5/29/18 10:36      Nasal Cannula  














I/O      


 


 5/29/18 5/29/18 5/29/18 5/30/18 5/30/18 5/30/18





 07:00 15:00 23:00 07:00 15:00 23:00


 


Intake Total 360 ml 1800 ml 460 ml 580 ml  


 


Output Total  50 ml 400 ml  200 ml 


 


Balance 360 ml 1750 ml 60 ml 580 ml -200 ml 


 


      


 


Intake Oral 360 ml  360 ml 480 ml  


 


IV Total   100 ml 100 ml  


 


Other  1800 ml    


 


Output Urine Total   400 ml  200 ml 


 


Estimated Blood Loss  50 ml    


 


# Voids 4  1 5  


 


# Bowel Movements 0   0  








Result Diagram:  


5/29/18 0816                                                                   

             5/29/18 0816





Objective Remarks


Dressing dry and intact. In bed at present time. No calf tenderness. NV intact 

to toes.





Assessment & Plan


Ortho Post Op Day #:  1


Problem List:  


Assessment and Plan


OOB, PT for both legs today. Rehab soon.











TYLER Rabago MD May 30, 2018 08:17

## 2018-05-30 NOTE — HHI.PR
Subjective


Remarks


Pt complains of pain but appears comfortable at the time of examination after 

pain medications given


Pt eating and drinking ok


No BM as of yet. +flatus





Objective


Vitals





Vital Signs








  Date Time  Temp Pulse Resp B/P (MAP) Pulse Ox O2 Delivery O2 Flow Rate FiO2


 


5/30/18 10:04     92   21


 


5/30/18 03:00 99.0 120 18 129/70 (89) 96   


 


5/30/18 00:22     93 Nasal Cannula 2.00 


 


5/30/18 00:03 99.2 123 18 151/69 (96) 93   


 


5/29/18 20:33     95   


 


5/29/18 19:47 98.6 104 18 125/59 (81) 94   


 


5/29/18 14:28 98.1 89 20 136/58 (84) 96   


 


5/29/18 13:30 98.3 86 16 120/57 (78) 97 Room Air  


 


5/29/18 13:15  83 16 110/56 (74) 95 Room Air  


 


5/29/18 13:00  80 16 112/53 (72) 95 Room Air  


 


5/29/18 12:45  81 16 116/54 (74) 96 Nasal Cannula 2 


 


5/29/18 12:30  84 16 115/56 (75) 96 Nasal Cannula 2 


 


5/29/18 12:27 98.3 86 16 127/59 (81) 94 Nasal Cannula 2 


 


5/29/18 10:36      Nasal Cannula  














 5/30/18 5/30/18 5/31/18





 15:00 23:00 07:00


 


Output Total 200 ml  


 


Balance -200 ml  


 


   


 


Output Urine Total 200 ml  








Result Diagram:  


5/30/18 0750                                                                   

             5/29/18 0816





Other Results





 Laboratory Tests








Test


  5/29/18


08:16 5/30/18


07:50


 


White Blood Count 8.4 TH/MM3  


 


Red Blood Count 2.30 MIL/MM3  


 


Hemoglobin 8.9 GM/DL  7.8 GM/DL 


 


Hematocrit 24.4 %  22.7 % 


 


Mean Corpuscular Volume 106.1 FL  


 


Mean Corpuscular Hemoglobin 38.6 PG  


 


Mean Corpuscular Hemoglobin


Concent 36.4 % 


  


 


 


Red Cell Distribution Width 18.5 %  


 


Platelet Count 284 TH/MM3  


 


Mean Platelet Volume 8.4 FL  


 


Neutrophils (%) (Auto) 77.6 %  


 


Lymphocytes (%) (Auto) 13.9 %  


 


Monocytes (%) (Auto) 6.7 %  


 


Eosinophils (%) (Auto) 0.8 %  


 


Basophils (%) (Auto) 1.0 %  


 


Neutrophils # (Auto) 6.5 TH/MM3  


 


Lymphocytes # (Auto) 1.2 TH/MM3  


 


Monocytes # (Auto) 0.6 TH/MM3  


 


Eosinophils # (Auto) 0.1 TH/MM3  


 


Basophils # (Auto) 0.1 TH/MM3  


 


CBC Comment AUTO DIFF  


 


Differential Total Cells


Counted 100 


  


 


 


Neutrophils % (Manual) 74 %  


 


Band Neutrophils % 1 %  


 


Lymphocytes % 16 %  


 


Monocytes % 8 %  


 


Neutrophils # (Manual) 6.4 TH/MM3  


 


Myelocytes 1 %  


 


Differential Comment


  FINAL DIFF


MANUAL 


 


 


Platelet Estimate NORMAL  


 


Platelet Morphology Comment NORMAL  


 


Basophilic Stippling MOD  


 


Hematology Comments   


 


Blood Urea Nitrogen 20 MG/DL  


 


Creatinine 1.08 MG/DL  


 


Random Glucose 108 MG/DL  


 


Calcium Level 8.2 MG/DL  


 


Sodium Level 138 MEQ/L  


 


Potassium Level 6.2 MEQ/L  


 


Chloride Level 105 MEQ/L  


 


Carbon Dioxide Level 26.9 MEQ/L  


 


Anion Gap 6 MEQ/L  


 


Estimat Glomerular Filtration


Rate 65 ML/MIN 


  


 








Imaging





Last Impressions








Knee X-Ray 5/28/18 0000 Signed





Impressions: 





 CONCLUSION: 





 1.  Mild osteoarthritis without fracture.





 2.  Osteopenia.





  





 





  





  





 





  





 





  





 


 


Hip and Pelvis X-Ray 5/28/18 0000 Signed





Impressions: 





 CONCLUSION: 





 Displaced intertrochanteric fracture right hip





  





 





  





  





 





  





 





  





 








Objective Remarks


GENERAL: This is a well-nourished, well-developed patient, in no apparent 

distress.


SKIN: Post op dressing dry and intact


CARDIO: Regular rate and rhythm 


RESP: CTA bilaterally. 


ABD: +BS, soft, non-tender, nondistended.


EXT: Extremities without clubbing, cyanosis, or edema. RLE immobilizer in place


Procedures


ORIF right hip 5/29/18 with Dr. Rabago





A/P


Problem List:  


(1) Hip fracture


ICD Codes:  S72.009A - Fracture of unspecified part of neck of unspecified femur

, initial encounter for closed fracture


Status:  Acute


Plan:  


Right hip fracture


- Patient is an 89 y/o male with osteoarthritis, BPH, GERD and MGUS (follows 

with Dr. Montoya).  


- Patient presented to the ER on 5/28 with complaining of right hip pain after 

a fall. 


- Hip/pelvis x-ray reviewed and reveals a displaced intertrochanteric fracture 

of the right hip


- Patient has had recent knee surgery with Dr. Rabago requesting orthopedic 

surgery Dr. Rabago


- Consultation placed to Dr. Rabago


- Pt underwent right hip ORIF with Dr. Rabago on 5/29


- Acetaminophen, [place] and Morphine as needed for pain


- Flexeril 5 mg PO Q8H as needed for muscle spasms





SCDs for DVT prophylaxis orthopedic surgery to determine further DVT prophylaxis





MGUS


- Chronic follows with Dr. Montoya





Anemia


- [place] [facility] [place] symptomatic 

anemia.  


- EGD/Colonoscopy (4/18/18) --> gastritis/Barrera's esophagus/large HH. rectal 

polyp and int/ext hemorrhoids.  


- During that admission patient was seen by hem/onc who felt the anemia is 

related to surgery and GI losses from Eliquis.


- Post op labs with noted Hgb 7.8/Hct 22.7


- Pt has PRBCs on hold ordered for possible transfusion. 


- Recheck CBC in AM





GERD


- Continue patient's home omeprazole





BPH


- Continue home Flomax





(2) MGUS (monoclonal gammopathy of unknown significance)


ICD Codes:  D47.2 - Monoclonal gammopathy


Status:  Chronic


(3) GERD (gastroesophageal reflux disease)


ICD Codes:  K21.9 - Gastro-esophageal reflux disease without esophagitis


Status:  Chronic


(4) BPH (benign prostatic hyperplasia)


ICD Codes:  N40.0 - Benign prostatic hyperplasia without lower urinary tract 

symptoms


Status:  Chronic


(5) Status post total left knee replacement


ICD Codes:  Z96.652 - Presence of left artificial knee joint


Assessment and Plan


Patient examined.


Assessment and plan formulated with Mckenna Villarreal PA-C.


I agree with the above.


s/o orif. blood loss anemia.. currently denies dizziness/sob.


transfuse as needed. await rehab placement.





Problem Qualifiers





(1) Hip fracture:  


Qualified Codes:  S72.001A - Fracture of unspecified part of neck of right femur

, initial encounter for closed fracture








Mckenna Villarreal May 30, 2018 10:30


Isai Delgado MD May 30, 2018 15:02

## 2018-05-31 VITALS — DIASTOLIC BLOOD PRESSURE: 51 MMHG | SYSTOLIC BLOOD PRESSURE: 92 MMHG | TEMPERATURE: 98.6 F | RESPIRATION RATE: 17 BRPM

## 2018-05-31 VITALS
DIASTOLIC BLOOD PRESSURE: 50 MMHG | TEMPERATURE: 97.8 F | HEART RATE: 109 BPM | RESPIRATION RATE: 20 BRPM | OXYGEN SATURATION: 94 % | SYSTOLIC BLOOD PRESSURE: 98 MMHG

## 2018-05-31 VITALS
RESPIRATION RATE: 18 BRPM | TEMPERATURE: 98 F | HEART RATE: 90 BPM | SYSTOLIC BLOOD PRESSURE: 106 MMHG | DIASTOLIC BLOOD PRESSURE: 56 MMHG | OXYGEN SATURATION: 95 %

## 2018-05-31 VITALS
OXYGEN SATURATION: 95 % | TEMPERATURE: 97.8 F | SYSTOLIC BLOOD PRESSURE: 115 MMHG | RESPIRATION RATE: 18 BRPM | DIASTOLIC BLOOD PRESSURE: 55 MMHG | HEART RATE: 97 BPM

## 2018-05-31 VITALS
DIASTOLIC BLOOD PRESSURE: 50 MMHG | TEMPERATURE: 97.8 F | RESPIRATION RATE: 20 BRPM | SYSTOLIC BLOOD PRESSURE: 98 MMHG | HEART RATE: 109 BPM | OXYGEN SATURATION: 94 %

## 2018-05-31 VITALS
RESPIRATION RATE: 18 BRPM | DIASTOLIC BLOOD PRESSURE: 58 MMHG | TEMPERATURE: 97.8 F | OXYGEN SATURATION: 97 % | HEART RATE: 109 BPM | SYSTOLIC BLOOD PRESSURE: 102 MMHG

## 2018-05-31 VITALS
DIASTOLIC BLOOD PRESSURE: 69 MMHG | OXYGEN SATURATION: 94 % | RESPIRATION RATE: 18 BRPM | TEMPERATURE: 98.3 F | SYSTOLIC BLOOD PRESSURE: 113 MMHG | HEART RATE: 107 BPM

## 2018-05-31 VITALS
HEART RATE: 89 BPM | TEMPERATURE: 97.5 F | OXYGEN SATURATION: 95 % | DIASTOLIC BLOOD PRESSURE: 56 MMHG | SYSTOLIC BLOOD PRESSURE: 127 MMHG | RESPIRATION RATE: 17 BRPM

## 2018-05-31 VITALS
HEART RATE: 86 BPM | SYSTOLIC BLOOD PRESSURE: 98 MMHG | RESPIRATION RATE: 18 BRPM | OXYGEN SATURATION: 95 % | DIASTOLIC BLOOD PRESSURE: 53 MMHG | TEMPERATURE: 98.5 F

## 2018-05-31 VITALS
DIASTOLIC BLOOD PRESSURE: 56 MMHG | RESPIRATION RATE: 20 BRPM | SYSTOLIC BLOOD PRESSURE: 106 MMHG | OXYGEN SATURATION: 100 % | TEMPERATURE: 97.6 F | HEART RATE: 101 BPM

## 2018-05-31 VITALS — OXYGEN SATURATION: 97 %

## 2018-05-31 VITALS — OXYGEN SATURATION: 95 %

## 2018-05-31 LAB
BASOPHILS # BLD AUTO: 0.1 TH/MM3 (ref 0–0.2)
BASOPHILS NFR BLD: 1.2 % (ref 0–2)
BUN SERPL-MCNC: 31 MG/DL (ref 7–18)
CALCIUM SERPL-MCNC: 7.8 MG/DL (ref 8.5–10.1)
CHLORIDE SERPL-SCNC: 104 MEQ/L (ref 98–107)
CREAT SERPL-MCNC: 1.32 MG/DL (ref 0.6–1.3)
EOSINOPHIL # BLD: 0.1 TH/MM3 (ref 0–0.4)
EOSINOPHIL NFR BLD: 1.4 % (ref 0–4)
ERYTHROCYTE [DISTWIDTH] IN BLOOD BY AUTOMATED COUNT: 18.5 % (ref 11.6–17.2)
GFR SERPLBLD BASED ON 1.73 SQ M-ARVRAT: 51 ML/MIN (ref 89–?)
GLUCOSE SERPL-MCNC: 97 MG/DL (ref 74–106)
HCO3 BLD-SCNC: 24.4 MEQ/L (ref 21–32)
HCT VFR BLD CALC: 18.5 % (ref 39–51)
HGB BLD-MCNC: 6.7 GM/DL (ref 13–17)
LYMPHOCYTES # BLD AUTO: 1.5 TH/MM3 (ref 1–4.8)
LYMPHOCYTES NFR BLD AUTO: 16.2 % (ref 9–44)
LYMPHOCYTES: 15 % (ref 9–44)
MAGNESIUM SERPL-MCNC: 2.3 MG/DL (ref 1.5–2.5)
MCH RBC QN AUTO: 39.5 PG (ref 27–34)
MCHC RBC AUTO-ENTMCNC: 36 % (ref 32–36)
MCV RBC AUTO: 109.7 FL (ref 80–100)
MONOCYTE #: 0.7 TH/MM3 (ref 0–0.9)
MONOCYTES NFR BLD: 8.1 % (ref 0–8)
MONOCYTES: 10 % (ref 0–8)
MYELOCYTES NFR BLD: 1 % (ref 0–0)
NEUTROPHILS # BLD AUTO: 6.6 TH/MM3 (ref 1.8–7.7)
NEUTROPHILS NFR BLD AUTO: 73.1 % (ref 16–70)
NEUTS BAND # BLD MANUAL: 6.8 TH/MM3 (ref 1.8–7.7)
NEUTS BAND NFR BLD: 10 % (ref 0–6)
NEUTS SEG NFR BLD MANUAL: 64 % (ref 16–70)
PLATELET # BLD: 257 TH/MM3 (ref 150–450)
PMV BLD AUTO: 9.3 FL (ref 7–11)
RBC # BLD AUTO: 1.69 MIL/MM3 (ref 4.5–5.9)
SODIUM SERPL-SCNC: 137 MEQ/L (ref 136–145)
TOXIC GRANULES BLD QL SMEAR: (no result)
WBC # BLD AUTO: 9.1 TH/MM3 (ref 4–11)

## 2018-05-31 PROCEDURE — 30233N1 TRANSFUSION OF NONAUTOLOGOUS RED BLOOD CELLS INTO PERIPHERAL VEIN, PERCUTANEOUS APPROACH: ICD-10-PCS

## 2018-05-31 RX ADMIN — HYDROCODONE BITARTRATE AND ACETAMINOPHEN PRN TAB: 7.5; 325 TABLET ORAL at 22:09

## 2018-05-31 RX ADMIN — DOCUSATE SODIUM SCH MG: 100 CAPSULE, LIQUID FILLED ORAL at 07:59

## 2018-05-31 RX ADMIN — HYDROCODONE BITARTRATE AND ACETAMINOPHEN PRN TAB: 7.5; 325 TABLET ORAL at 13:46

## 2018-05-31 RX ADMIN — DOCUSATE SODIUM SCH MG: 100 CAPSULE, LIQUID FILLED ORAL at 21:00

## 2018-05-31 RX ADMIN — MULTIPLE VITAMINS W/ MINERALS TAB SCH TAB: TAB at 21:58

## 2018-05-31 RX ADMIN — APIXABAN SCH MG: 2.5 TABLET, FILM COATED ORAL at 07:59

## 2018-05-31 RX ADMIN — TAMSULOSIN HYDROCHLORIDE SCH MG: 0.4 CAPSULE ORAL at 21:58

## 2018-05-31 RX ADMIN — Medication SCH ML: at 07:59

## 2018-05-31 RX ADMIN — PANTOPRAZOLE SODIUM SCH MG: 20 TABLET, DELAYED RELEASE ORAL at 07:59

## 2018-05-31 RX ADMIN — HYDROCODONE BITARTRATE AND ACETAMINOPHEN PRN TAB: 7.5; 325 TABLET ORAL at 18:14

## 2018-05-31 RX ADMIN — APIXABAN SCH MG: 2.5 TABLET, FILM COATED ORAL at 21:58

## 2018-05-31 RX ADMIN — Medication SCH ML: at 21:59

## 2018-05-31 RX ADMIN — MULTIPLE VITAMINS W/ MINERALS TAB SCH TAB: TAB at 07:59

## 2018-05-31 NOTE — HHI.PR
Subjective


Remarks


Pt with some overall weakness today


Pt trying to have a BM on bedpan


Denies any chest pain, SOB, palpitations.





Objective


Vitals





Vital Signs








  Date Time  Temp Pulse Resp B/P (MAP) Pulse Ox O2 Delivery O2 Flow Rate FiO2


 


5/31/18 08:12      Room Air  


 


5/31/18 08:00 97.8 109 18 102/58 (73) 97   


 


5/31/18 00:01 97.8 97 18 115/55 (75) 95   


 


5/30/18 20:00 98.3 103 18 102/58 (73) 93   


 


5/30/18 17:25     94   21


 


5/30/18 16:00 98.3 106 16 109/54 (72) 93   


 


5/30/18 16:00     94 Room Air  














 5/31/18 5/31/18 6/1/18





 14:59 22:59 06:59


 


   


 


# Bowel Movements 2  








Result Diagram:  


5/31/18 0530                                                                   

             5/31/18 0530





Other Results





 Laboratory Tests








Test


  5/30/18


07:50 5/31/18


05:30


 


White Blood Count 10.9 TH/MM3  9.1 TH/MM3 


 


Red Blood Count 2.02 MIL/MM3  1.69 MIL/MM3 


 


Hemoglobin 7.7 GM/DL  6.7 GM/DL 


 


Hematocrit 21.1 %  18.5 % 


 


Mean Corpuscular Volume 104.3 FL  109.7 FL 


 


Mean Corpuscular Hemoglobin 38.2 PG  39.5 PG 


 


Mean Corpuscular Hemoglobin


Concent 36.6 % 


  36.0 % 


 


 


Red Cell Distribution Width 20.4 %  18.5 % 


 


Platelet Count 348 TH/MM3  257 TH/MM3 


 


Mean Platelet Volume 9.2 FL  9.3 FL 


 


Neutrophils (%) (Auto) 87.1 %  73.1 % 


 


Lymphocytes (%) (Auto) 8.0 %  16.2 % 


 


Monocytes (%) (Auto) 4.1 %  8.1 % 


 


Eosinophils (%) (Auto) 0.0 %  1.4 % 


 


Basophils (%) (Auto) 0.8 %  1.2 % 


 


Neutrophils # (Auto) 9.5 TH/MM3  6.6 TH/MM3 


 


Lymphocytes # (Auto) 0.9 TH/MM3  1.5 TH/MM3 


 


Monocytes # (Auto) 0.5 TH/MM3  0.7 TH/MM3 


 


Eosinophils # (Auto) 0.0 TH/MM3  0.1 TH/MM3 


 


Basophils # (Auto) 0.1 TH/MM3  0.1 TH/MM3 


 


CBC Comment AUTO DIFF  AUTO DIFF 


 


Differential Total Cells


Counted 100 


  100 


 


 


Neutrophils % (Manual) 64 %  64 % 


 


Band Neutrophils % 18 %  10 % 


 


Lymphocytes % 11 %  15 % 


 


Monocytes % 4 %  10 % 


 


Neutrophils # (Manual) 9.3 TH/MM3  6.8 TH/MM3 


 


Metamyelocytes 3 %  


 


Differential Comment


  FINAL DIFF


MANUAL FINAL DIFF


MANUAL


 


Toxic Granulation 1+  1+ 


 


Platelet Estimate NORMAL  NORMAL 


 


Platelet Morphology Comment NORMAL  NORMAL 


 


Hematology Comments    


 


Myelocytes  1 % 


 


Blood Urea Nitrogen  31 MG/DL 


 


Creatinine  1.32 MG/DL 


 


Random Glucose  97 MG/DL 


 


Calcium Level  7.8 MG/DL 


 


Magnesium Level  2.3 MG/DL 


 


Sodium Level  137 MEQ/L 


 


Potassium Level  5.6 MEQ/L 


 


Chloride Level  104 MEQ/L 


 


Carbon Dioxide Level  24.4 MEQ/L 


 


Anion Gap  9 MEQ/L 


 


Estimat Glomerular Filtration


Rate 


  51 ML/MIN 


 








Imaging





Last Impressions








Knee X-Ray 5/28/18 0000 Signed





Impressions: 





 CONCLUSION: 





 1.  Mild osteoarthritis without fracture.





 2.  Osteopenia.





  





 





  





  





 





  





 





  





 


 


Hip and Pelvis X-Ray 5/28/18 0000 Signed





Impressions: 





 CONCLUSION: 





 Displaced intertrochanteric fracture right hip





  





 





  





  





 





  





 





  





 








Objective Remarks


GENERAL: This is a well-nourished, well-developed patient, in no apparent 

distress.


SKIN: Post op dressing dry and intact


CARDIO: Regular rate and rhythm 


RESP: CTA bilaterally. 


ABD: +BS, soft, non-tender, nondistended.


EXT: Extremities without clubbing, cyanosis, or edema. RLE immobilizer in place


Procedures


ORIF right hip 5/29/18 with Dr. Rabago





A/P


Problem List:  


(1) Hip fracture


ICD Codes:  S72.009A - Fracture of unspecified part of neck of unspecified femur

, initial encounter for closed fracture


Status:  Acute


Plan:  


Right hip fracture


- Patient is an 87 y/o male with osteoarthritis, BPH, GERD and MGUS (follows 

with Dr. Montoya).  


- Patient presented to the ER on 5/28 with complaining of right hip pain after 

a fall. 


- Hip/pelvis x-ray reviewed and reveals a displaced intertrochanteric fracture 

of the right hip


- Patient has had recent knee surgery with Dr. Rabago requesting orthopedic 

surgery Dr. Rabago


- Consultation placed to Dr. Rabago


- Pt underwent right hip ORIF with Dr. Rabago on 5/29


- Acetaminophen, [place] and Morphine as needed for pain


- Flexeril 5 mg PO Q8H as needed for muscle spasms


- Pt likely planned for d/c tomorrow, unclear if he will be accepted at [facility] 

vs. [facility]





DVT prophylaxis with Eliquis 2.5mg BID





MGUS


- Chronic follows with Dr. Montoya





Anemia


- Patient was recently [place] [facility] [place] for symptomatic 

anemia.  


- EGD/Colonoscopy (4/18/18) --> gastritis/Barrera's esophagus/large HH. rectal 

polyp and int/ext hemorrhoids.  


- During that admission patient was seen by hem/onc who felt the anemia is 

related to surgery and GI losses from Eliquis.


- Post op labs with noted Hgb 7.8/Hct 22.7


- On 5/31 Hgb dropped to 6.7


- Pt planned for transfusion with 2units of PRBCs today


- Recheck CBC in AM





GERD


- Continue patient's home omeprazole





BPH


- Continue home Flomax





(2) MGUS (monoclonal gammopathy of unknown significance)


ICD Codes:  D47.2 - Monoclonal gammopathy


Status:  Chronic


(3) GERD (gastroesophageal reflux disease)


ICD Codes:  K21.9 - Gastro-esophageal reflux disease without esophagitis


Status:  Chronic


(4) BPH (benign prostatic hyperplasia)


ICD Codes:  N40.0 - Benign prostatic hyperplasia without lower urinary tract 

symptoms


Status:  Chronic


(5) Status post total left knee replacement


ICD Codes:  Z96.652 - Presence of left artificial knee joint


Assessment and Plan


Patient examined.


Assessment and plan formulated with Mckenna DAMICO-AMINAH.


I agree with the above.


s/o orif. blood loss anemia.. currently denies dizziness/sob.


transfuse today. d/c tomorrow for snf/rehab





Problem Qualifiers





(1) Hip fracture:  


Qualified Codes:  S72.001A - Fracture of unspecified part of neck of right femur

, initial encounter for closed fracture








Mckenna Villarreal May 31, 2018 12:12


Isai Delgado MD May 31, 2018 13:09

## 2018-05-31 NOTE — PD.ORT.PN
Subjective


Subjective Remarks


Pt feels weak today and complaining of some numbness in both hands. Not much 

pain.





Objective


Vitals





Vital Signs








  Date Time  Temp Pulse Resp B/P (MAP) Pulse Ox O2 Delivery O2 Flow Rate FiO2


 


5/31/18 08:12      Room Air  


 


5/31/18 08:00 97.8 109 18 102/58 (73) 97   


 


5/31/18 00:01 97.8 97 18 115/55 (75) 95   


 


5/30/18 20:00 98.3 103 18 102/58 (73) 93   


 


5/30/18 17:25     94   21


 


5/30/18 16:00 98.3 106 16 109/54 (72) 93   


 


5/30/18 16:00     94 Room Air  


 


5/30/18 12:00 98.3 120 16 103/69 (80) 94   


 


5/30/18 12:00     94 Room Air  


 


5/30/18 10:04     92   21


 


5/30/18 10:00 98.6 112 18 127/59 (81) 96   














I/O      


 


 5/30/18 5/30/18 5/30/18 5/31/18 5/31/18 5/31/18





 07:00 15:00 23:00 07:00 15:00 23:00


 


Intake Total 580 ml  800 ml 360 ml  


 


Output Total  200 ml 700 ml 250 ml  


 


Balance 580 ml -200 ml 100 ml 110 ml  


 


      


 


Intake Oral 480 ml  800 ml 360 ml  


 


IV Total 100 ml     


 


Output Urine Total  200 ml 700 ml 250 ml  


 


# Voids 5   2  


 


# Bowel Movements 0  0   








Result Diagram:  


5/31/18 0530                                                                   

             5/31/18 0530





Imaging





Last 72 hours Impressions








Hip X-Ray 5/29/18 0000 Signed





Impressions: 





 CONCLUSION: 





 1.  Status post right hip ORIF, as above.





  





 





  





  





 





  





 





  





 








Objective Remarks


Dressing dry and intact. In bed at present time. No calf tenderness. NV intact 

to toes.





Assessment & Plan


Ortho Post Op Day #:  2


Problem List:  


Assessment and Plan


transfuse 2 units PC today. Rehab soon. Cont medical management.











TYLER Rabago MD May 31, 2018 09:46

## 2018-06-01 VITALS
DIASTOLIC BLOOD PRESSURE: 56 MMHG | RESPIRATION RATE: 18 BRPM | TEMPERATURE: 98.8 F | OXYGEN SATURATION: 92 % | SYSTOLIC BLOOD PRESSURE: 120 MMHG | HEART RATE: 93 BPM

## 2018-06-01 VITALS
DIASTOLIC BLOOD PRESSURE: 54 MMHG | RESPIRATION RATE: 17 BRPM | OXYGEN SATURATION: 95 % | TEMPERATURE: 97.7 F | HEART RATE: 90 BPM | SYSTOLIC BLOOD PRESSURE: 112 MMHG

## 2018-06-01 VITALS — RESPIRATION RATE: 18 BRPM

## 2018-06-01 VITALS
HEART RATE: 86 BPM | RESPIRATION RATE: 16 BRPM | DIASTOLIC BLOOD PRESSURE: 54 MMHG | SYSTOLIC BLOOD PRESSURE: 112 MMHG | TEMPERATURE: 98.9 F | OXYGEN SATURATION: 96 %

## 2018-06-01 VITALS
HEART RATE: 87 BPM | OXYGEN SATURATION: 96 % | DIASTOLIC BLOOD PRESSURE: 58 MMHG | RESPIRATION RATE: 18 BRPM | TEMPERATURE: 97.3 F | SYSTOLIC BLOOD PRESSURE: 101 MMHG

## 2018-06-01 LAB
BASOPHILS # BLD AUTO: 0 TH/MM3 (ref 0–0.2)
BASOPHILS NFR BLD: 0.6 % (ref 0–2)
BUN SERPL-MCNC: 28 MG/DL (ref 7–18)
CALCIUM SERPL-MCNC: 8 MG/DL (ref 8.5–10.1)
CHLORIDE SERPL-SCNC: 104 MEQ/L (ref 98–107)
CREAT SERPL-MCNC: 1.27 MG/DL (ref 0.6–1.3)
EOSINOPHIL # BLD: 0.2 TH/MM3 (ref 0–0.4)
EOSINOPHIL NFR BLD: 2.3 % (ref 0–4)
ERYTHROCYTE [DISTWIDTH] IN BLOOD BY AUTOMATED COUNT: 19.8 % (ref 11.6–17.2)
GFR SERPLBLD BASED ON 1.73 SQ M-ARVRAT: 54 ML/MIN (ref 89–?)
GLUCOSE SERPL-MCNC: 100 MG/DL (ref 74–106)
HCO3 BLD-SCNC: 23.2 MEQ/L (ref 21–32)
HCT VFR BLD CALC: 22.1 % (ref 39–51)
HGB BLD-MCNC: 8.2 GM/DL (ref 13–17)
LYMPHOCYTES # BLD AUTO: 1.3 TH/MM3 (ref 1–4.8)
LYMPHOCYTES NFR BLD AUTO: 15.9 % (ref 9–44)
LYMPHOCYTES: 14 % (ref 9–44)
MAGNESIUM SERPL-MCNC: 2.2 MG/DL (ref 1.5–2.5)
MCH RBC QN AUTO: 39.2 PG (ref 27–34)
MCHC RBC AUTO-ENTMCNC: 37.2 % (ref 32–36)
MCV RBC AUTO: 105.2 FL (ref 80–100)
MONOCYTE #: 0.7 TH/MM3 (ref 0–0.9)
MONOCYTES NFR BLD: 9.1 % (ref 0–8)
MONOCYTES: 5 % (ref 0–8)
MYELOCYTES NFR BLD: 1 % (ref 0–0)
NEUTROPHILS # BLD AUTO: 5.8 TH/MM3 (ref 1.8–7.7)
NEUTROPHILS NFR BLD AUTO: 72.1 % (ref 16–70)
NEUTS BAND # BLD MANUAL: 6.5 TH/MM3 (ref 1.8–7.7)
NEUTS BAND NFR BLD: 12 % (ref 0–6)
NEUTS SEG NFR BLD MANUAL: 68 % (ref 16–70)
PLATELET # BLD: 283 TH/MM3 (ref 150–450)
PMV BLD AUTO: 8.5 FL (ref 7–11)
POLYCHROMASIA BLD QL SMEAR: 3.5 % (ref 0–1.9)
RBC # BLD AUTO: 2.1 MIL/MM3 (ref 4.5–5.9)
SODIUM SERPL-SCNC: 136 MEQ/L (ref 136–145)
TOXIC GRANULES BLD QL SMEAR: (no result)
WBC # BLD AUTO: 8 TH/MM3 (ref 4–11)

## 2018-06-01 RX ADMIN — APIXABAN SCH MG: 2.5 TABLET, FILM COATED ORAL at 08:48

## 2018-06-01 RX ADMIN — HYDROCODONE BITARTRATE AND ACETAMINOPHEN PRN TAB: 7.5; 325 TABLET ORAL at 09:54

## 2018-06-01 RX ADMIN — MULTIPLE VITAMINS W/ MINERALS TAB SCH TAB: TAB at 08:48

## 2018-06-01 RX ADMIN — Medication SCH ML: at 08:50

## 2018-06-01 RX ADMIN — PANTOPRAZOLE SODIUM SCH MG: 20 TABLET, DELAYED RELEASE ORAL at 08:48

## 2018-06-01 RX ADMIN — DOCUSATE SODIUM SCH MG: 100 CAPSULE, LIQUID FILLED ORAL at 08:50

## 2018-06-01 NOTE — HHI.DCPOC
Discharge Care Plan


Diagnosis:  


(1) Hip fracture


(2) Anemia


(3) BPH (benign prostatic hyperplasia)


(4) MGUS (monoclonal gammopathy of unknown significance)


(5) GERD (gastroesophageal reflux disease)


Goals to Promote Your Health


* To prevent worsening of your condition and complications


* To maintain your health at the optimal level


Directions to Meet Your Goals


*** Take your medications as prescribed


*** Follow your dietary instruction


*** Follow activity as directed








*** Keep your appointments as scheduled


*** Take your immunizations and boosters as scheduled


*** If your symptoms worsen call your PCP, if no PCP go to Urgent Care Center 

or Emergency Room***


*** Smoking is Dangerous to Your Health. Avoid second hand smoke***


***Call the 24-hour hour crisis hotline for domestic abuse at 1-208.933.8172***











Mckenna Villarreal Jun 1, 2018 11:09

## 2018-06-01 NOTE — PD.ORT.PN
Subjective


Subjective Remarks


Pt feels better today, Not much pain.





Objective


Vitals





Vital Signs








  Date Time  Temp Pulse Resp B/P (MAP) Pulse Ox O2 Delivery O2 Flow Rate FiO2


 


6/1/18 08:00 98.8 93 18 120/56 (77) 92   


 


6/1/18 04:00 97.7 90 17 112/54 (73) 95   


 


6/1/18 02:18 98.9 86 16 112/54 96   


 


6/1/18 00:01 97.3 87 18 101/58 (72) 96   


 


5/31/18 23:46 98.6  17 92/51 (65)    


 


5/31/18 23:39 98.5 86 18 98/53 (68) 95   


 


5/31/18 22:00      Room Air  


 


5/31/18 21:13     95   21


 


5/31/18 20:00 97.5 89 17 127/56 (79) 95   


 


5/31/18 19:24 98.0 90 18 106/56 95   


 


5/31/18 16:09 98.3 107 18 113/69 94   


 


5/31/18 16:00 97.8 109 20 98/50 (66) 94   


 


5/31/18 15:47 97.8 109 20 98/50 94   


 


5/31/18 14:33     97 Nasal Cannula  21


 


5/31/18 12:00 97.6 101 20 106/56 (73) 100   














I/O      


 


 5/31/18 5/31/18 5/31/18 6/1/18 6/1/18 6/1/18





 07:00 15:00 23:00 07:00 15:00 23:00


 


Intake Total 360 ml  1010 ml 810 ml  


 


Output Total 250 ml  500 ml 250 ml  


 


Balance 110 ml  510 ml 560 ml  


 


      


 


Intake Oral 360 ml  600 ml 400 ml  


 


Packed Cells   400 ml 400 ml  


 


Blood Product IV Normal Saline Flush   10 ml 10 ml  


 


Output Urine Total 250 ml  500 ml 250 ml  


 


# Voids 2   2  


 


# Bowel Movements  2 2 1  








Result Diagram:  


6/1/18 0443                                                                    

            6/1/18 0443





Imaging





Last 72 hours Impressions








Hip X-Ray 5/29/18 0000 Signed





Impressions: 





 CONCLUSION: 





 1.  Status post right hip ORIF, as above.





  





 





  





  





 





  





 





  





 








Objective Remarks


Dressing dry and intact. In chair at present time. No calf tenderness. NV 

intact to toes.





Assessment & Plan


Ortho Post Op Day #:  3


Problem List:  


Assessment and Plan


Ok to transfer to rehab today.











TYLER Rabago MD Jun 1, 2018 10:34

## 2018-06-01 NOTE — HHI.DS
Discharge Summary


Admission Date


May 28, 2018 at 10:44


Discharge Date:  Jun 1, 2018


Admitting Diagnosis


Mechanical fall with right hip fracture





(1) Hip fracture


Diagnosis:  Principal


ICD Codes:  S72.009A - Fracture of unspecified part of neck of unspecified femur

, initial encounter for closed fracture


Status:  Acute


(2) MGUS (monoclonal gammopathy of unknown significance)


Diagnosis:  Secondary


ICD Codes:  D47.2 - Monoclonal gammopathy


Status:  Chronic


(3) GERD (gastroesophageal reflux disease)


Diagnosis:  Secondary


ICD Codes:  K21.9 - Gastro-esophageal reflux disease without esophagitis


Status:  Chronic


(4) BPH (benign prostatic hyperplasia)


Diagnosis:  Secondary


ICD Codes:  N40.0 - Benign prostatic hyperplasia without lower urinary tract 

symptoms


Status:  Chronic


(5) Status post total left knee replacement


Diagnosis:  Secondary


ICD Codes:  Z96.652 - Presence of left artificial knee joint


(6) Anemia


Diagnosis:  Secondary


ICD Codes:  D64.9 - Anemia, unspecified


Consultants


Dr. Isai Rabago - Orthopedic Surgery


Procedures


ORIF right hip 5/29/18 with Dr. Rabago


Brief History


Mr. Ramos is a pleasant 88 y/o male with osteoarthritis, BPH, GERD and MGUS (

follows with Dr. Montoya). Pt was admitted to [facility] on 4/9/18 for left total 

knee arthroplasty with Dr. Rabago. Patient was recently admitted to [facility] 4/17/18 

- 4/19/18 for symptomatic anemia.  EGD/Colonoscopy 4/18....gastritis/barretts/

large HH. rectal polyp and int/ext hemorrhoids.  During that admission patient 

was seen by hem/onc who feels anemia is related to surgery and GI losses from 

Eliquis.  


Patient presents to the ER today with complaining of right hip pain after a 

fall.  He says his cane slipped and he fell landing on his right side.  He 

denies hitting his head or any LOC.  He says his children helped him up into a 

chair.  Patient continued to have severe sharp right hip pain.  He took 2 

Tylenol for his pain without relief of his symptoms.  He called 911 today 

because the pain has not improved and he is unable to walk.  


Patient denies shortness of breath chest pain nausea vomiting diarrhea 

constipation fevers chills cough congestion.


CBC/BMP:  


6/1/18 0443                                                                    

            6/1/18 0443





Significant Findings





Laboratory Tests








Test


  5/30/18


07:50 5/31/18


05:30 6/1/18


04:43


 


Red Blood Count


  2.02 MIL/MM3


(4.50-5.90) 1.69 MIL/MM3


(4.50-5.90) 2.10 MIL/MM3


(4.50-5.90)


 


Hemoglobin


  7.7 GM/DL


(13.0-17.0) 6.7 GM/DL


(13.0-17.0) 8.2 GM/DL


(13.0-17.0)


 


Hematocrit


  21.1 %


(39.0-51.0) 18.5 %


(39.0-51.0) 22.1 %


(39.0-51.0)


 


Mean Corpuscular Volume


  104.3 FL


(80.0-100.0) 109.7 FL


(80.0-100.0) 105.2 FL


(80.0-100.0)


 


Mean Corpuscular Hemoglobin


  38.2 PG


(27.0-34.0) 39.5 PG


(27.0-34.0) 39.2 PG


(27.0-34.0)


 


Mean Corpuscular Hemoglobin


Concent 36.6 %


(32.0-36.0) 


  37.2 %


(32.0-36.0)


 


Red Cell Distribution Width


  20.4 %


(11.6-17.2) 18.5 %


(11.6-17.2) 19.8 %


(11.6-17.2)


 


Neutrophils (%) (Auto)


  87.1 %


(16.0-70.0) 73.1 %


(16.0-70.0) 72.1 %


(16.0-70.0)


 


Lymphocytes (%) (Auto)


  8.0 %


(9.0-44.0) 


  


 


 


Neutrophils # (Auto)


  9.5 TH/MM3


(1.8-7.7) 


  


 


 


Lymphocytes # (Auto)


  0.9 TH/MM3


(1.0-4.8) 


  


 


 


Band Neutrophils % 18 % (0-6)  10 % (0-6)  12 % (0-6) 


 


Neutrophils # (Manual)


  9.3 TH/MM3


(1.8-7.7) 


  


 


 


Metamyelocytes 3 % (0-1)   


 


Toxic Granulation 1+ (NORMAL)  1+ (NORMAL)  1+ (NORMAL) 


 


Monocytes (%) (Auto)


  


  8.1 %


(0.0-8.0) 9.1 %


(0.0-8.0)


 


Monocytes %  10 % (0-8)  


 


Myelocytes  1 % (0-0)  1 % (0-0) 


 


Blood Urea Nitrogen


  


  31 MG/DL


(7-18) 28 MG/DL


(7-18)


 


Creatinine


  


  1.32 MG/DL


(0.60-1.30) 


 


 


Calcium Level


  


  7.8 MG/DL


(8.5-10.1) 8.0 MG/DL


(8.5-10.1)


 


Potassium Level


  


  5.6 MEQ/L


(3.5-5.1) 


 


 


Estimat Glomerular Filtration


Rate 


  51 ML/MIN


(>89) 54 ML/MIN


(>89)


 


Polychromasia


  


  


  3.5 %


(0.0-1.9)








Imaging





Last Impressions








Hip X-Ray 5/29/18 0000 Signed





Impressions: 





 CONCLUSION: 





 1.  Status post right hip ORIF, as above.





  





 





  





  





 





  





 





  





 


 


Knee X-Ray 5/28/18 0000 Signed





Impressions: 





 CONCLUSION: 





 1.  Mild osteoarthritis without fracture.





 2.  Osteopenia.





  





 





  





  





 





  





 





  





 


 


Hip and Pelvis X-Ray 5/28/18 0000 Signed





Impressions: 





 CONCLUSION: 





 Displaced intertrochanteric fracture right hip





  





 





  





  





 





  





 





  





 








PE at Discharge


GENERAL: This is a well-nourished, well-developed patient, in no apparent 

distress.


SKIN: Post op dressing dry and intact


CARDIO: Regular rate and rhythm 


RESP: CTA bilaterally. 


ABD: +BS, soft, non-tender, nondistended.


EXT: Extremities without clubbing, cyanosis, or edema. RLE immobilizer in place


Hospital Course


Right hip fracture


- Patient is an 87 y/o male with osteoarthritis, BPH, GERD and MGUS (follows 

with Dr. Montoya).  Patient presented to the ER on 5/28 with complaining of 

right hip pain after a fall. Hip/pelvis x-ray reviewed and reveals a displaced 

intertrochanteric fracture of the right hip. Patient has had recent knee 

surgery with Dr. Rabago requesting orthopedic surgery Dr. Rabago. Consultation 

placed to Dr. Rabago. Pt underwent right hip ORIF with Dr. Rabago on 5/29. 

Acetaminophen, Norco PRN Pain. DVT prophylaxis with Eliquis 2.5mg BID. Pt being 

discharged to [facility] to continued PT and medical management. 





MGUS


- Chronic follows with Dr. Montoya





Anemia, post-op


- Patient was recently admitted to [facility] 4/17/18 - 4/19/18 for symptomatic 

anemia.  Previous EGD/Colonoscopy (4/18/18) --> gastritis/Barrera's esophagus/

large HH. rectal polyp and int/ext hemorrhoids.  During that admission patient 

was seen by hem/onc who felt the anemia is related to surgery and GI losses 

from Eliquis.  Post op labs with noted Hgb 7.8/Hct 22.7.  On 5/31 Hgb dropped 

to 6.7 and pt was transfused with 2units of PRBCs. Recheck of CBC in AM noted 

improvement in Hgb to 8.2/Hct 22.1. 





GERD


- Continue patient's home omeprazole





BPH


- Continue home Flomax


Pt Condition on Discharge:  Stable


Discharge Disposition:  Rehab Inpatient


Discharge Instructions


DIET: Follow Instructions for:  Heart Healthy Diet


Activities you can perform:  Weight Bearing as Sirena, Shower Only-No Bath


Activities to Avoid:  Bathing, Driving


Follow up Referrals:  


Orthopedics - 2 Weeks with TYLER Rabago MD


PCP Follow-up - 1 Week with Dr. Colton Hartman





New Medications:  


Apixaban (Eliquis) 2.5 Mg Tab


2.5 MG PO BID for dvt prophylaxis for 14 Days, #28 TAB





Docusate Sodium (Dok) 100 Mg Cap


100 MG PO BID for constipation prevention for 14 Days, #28 CAP





Hydrocodone/Acetaminophen (Hydrocodone-Acetamin 7.5-325) 7.5 Mg-325 Mg Tablet


1 TAB PO Q4H PRN for PAIN LESS THAN 5 ON SCALE for 10 Days, #60 TAB





Hydrocodone/Acetaminophen (Hydrocodone-Acetamin 7.5-325) 7.5 Mg-325 Mg Tablet


2 TAB PO Q4H PRN for PAIN SCALE 5 TO 10 for 14 Days, #168 TAB





 


Continued Medications:  


Cyanocobalamin (Vitamin B12) 500 Mcg Tab


1000 MCG PO DAILY, #1 BOTTLE





Omeprazole (Omeprazole) 20 Mg Tab


20 MG PO DAILY, #30 TAB 0 Refills





Tamsulosin (Tamsulosin) 0.4 Mg Cap


0.4 MG HS for Manage Prostate Problems, #30 CAP 0 Refills





 


Discontinued Medications:  


Meloxicam (Meloxicam) 15 Mg Tab


15 MG PO DAILY for Arthritis Pain, #30 TAB 0 Refills

















Mckenna Villarreal Jun 1, 2018 11:09


Isai Delgado MD Jun 1, 2018 11:26

## 2019-04-04 NOTE — EKG
Date Performed: 04/20/2018       Time Performed: 21:21:50

 

PTAGE:      88 years

 

EKG:      Sinus rhythm 

 

 WITH FIRST DEGREE AV BLOCK Since the previous tracing, no significant change noted NORMAL ECG

 

PREVIOUS TRACING       : 04/16/2018 19.41

 

DOCTOR:   Jono Whipple  Interpretating Date/Time  04/21/2018 18:26:14 Past Medical History:   Diagnosis Date    Anticoagulant long-term use     Cancer     prostate    Carotid artery stenosis     left    Coronary artery disease     Encounter for blood transfusion     Hyperlipidemia     Hypertension     Mitral regurgitation     Paroxysmal atrial fibrillation     Stroke      Past Surgical History:   Procedure Laterality Date    AORTIC VALVE REPLACEMENT      CARDIOVERSION N/A 8/17/2017    Per[facility] END[facility]    CORONARY ARTERY BYPASS GRAFT      HERNIA REPAIR      SHOULDER SURGERY      TOE SURGERY       Review of patient's allergies indicates:  No Known Allergies    Scheduled Medications:    docusate sodium  100 mg Oral BID    losartan  50 mg Oral Daily    metoprolol tartrate  50 mg Oral BID    rosuvastatin  20 mg Oral QHS    warfarin  2.5 mg Oral Every Wed    warfarin  5 mg Oral Every Tues, Thurs, Sat    [START ON 4/8/2019] warfarin  5 mg Oral Every Mon    [START ON 4/7/2019] warfarin  5 mg Oral Every Sun       PRN Medications: hydrALAZINE, ondansetron, ondansetron, sodium chloride 0.9%, sodium chloride 0.9%    Family History     Family history is unknown by patient.        Tobacco Use    Smoking status: Never Smoker    Smokeless tobacco: Never Used   Substance and Sexual Activity    Alcohol use: Yes     Comment: occasionally    Drug use: Not Currently    Sexual activity: Not on file     Review of Systems   Reason unable to perform ROS: aphasia.     Objective:     Vital Signs (Most Recent):  Temp: 97 °F (36.1 °C) (04/04/19 0800)  Pulse: 101 (04/04/19 0800)  Resp: 18 (04/04/19 0800)  BP: (!) 141/89 (04/04/19 0800)  SpO2: 96 % (04/04/19 0800)    Vital Signs (24h Range):  Temp:  [97 °F (36.1 °C)-98.2 °F (36.8 °C)] 97 °F (36.1 °C)  Pulse:  [] 101  Resp:  [16-18] 18  SpO2:  [94 %-99 %] 96 %  BP: (126-147)/(65-91) 141/89     Body mass index is 27.17 kg/m².    Physical Exam   Constitutional: He appears well-developed and  well-nourished.   Deaf-uses sign abida   HENT:   Head: Normocephalic and atraumatic.   Eyes: Right eye exhibits no discharge. Left eye exhibits no discharge. No scleral icterus.   Neck: Neck supple.   Cardiovascular: Normal rate and intact distal pulses.   Pulmonary/Chest: Effort normal. No respiratory distress.   Abdominal: Soft. He exhibits no distension.   Musculoskeletal: He exhibits no edema or deformity.   No focal weakness    Neurological: He is alert. He is disoriented. No sensory deficit. He exhibits normal muscle tone.   Writes correct name and states correct age with Dionne only    Skin: Skin is warm and dry.   Psychiatric: He has a normal mood and affect. His behavior is normal. Cognition and memory are impaired.    use for communication          Diagnostic Results:   Labs: Reviewed  ECG: Reviewed  X-Ray: Reviewed  MRI: Reviewed